# Patient Record
Sex: FEMALE | Race: AMERICAN INDIAN OR ALASKA NATIVE | NOT HISPANIC OR LATINO | URBAN - METROPOLITAN AREA
[De-identification: names, ages, dates, MRNs, and addresses within clinical notes are randomized per-mention and may not be internally consistent; named-entity substitution may affect disease eponyms.]

---

## 2018-09-19 ENCOUNTER — EMERGENCY (EMERGENCY)
Facility: HOSPITAL | Age: 63
LOS: 1 days | Discharge: HOME | End: 2018-09-19
Attending: EMERGENCY MEDICINE
Payer: MEDICAID

## 2018-09-19 VITALS
DIASTOLIC BLOOD PRESSURE: 76 MMHG | HEART RATE: 88 BPM | OXYGEN SATURATION: 98 % | SYSTOLIC BLOOD PRESSURE: 157 MMHG | WEIGHT: 145.06 LBS | TEMPERATURE: 99 F | HEIGHT: 59 IN | RESPIRATION RATE: 18 BRPM

## 2018-09-19 LAB
ALBUMIN SERPL ELPH-MCNC: 2.7 G/DL — LOW (ref 3.5–5)
ALP SERPL-CCNC: 71 U/L — SIGNIFICANT CHANGE UP (ref 40–120)
ALT FLD-CCNC: 231 U/L DA — HIGH (ref 10–60)
ANION GAP SERPL CALC-SCNC: 8 MMOL/L — SIGNIFICANT CHANGE UP (ref 5–17)
APPEARANCE UR: CLEAR — SIGNIFICANT CHANGE UP
AST SERPL-CCNC: 116 U/L — HIGH (ref 10–40)
BASOPHILS # BLD AUTO: 0 K/UL — SIGNIFICANT CHANGE UP (ref 0–0.2)
BASOPHILS NFR BLD AUTO: 0.7 % — SIGNIFICANT CHANGE UP (ref 0–2)
BILIRUB SERPL-MCNC: 0.5 MG/DL — SIGNIFICANT CHANGE UP (ref 0.2–1.2)
BILIRUB UR-MCNC: NEGATIVE — SIGNIFICANT CHANGE UP
BUN SERPL-MCNC: 8 MG/DL — SIGNIFICANT CHANGE UP (ref 7–18)
CALCIUM SERPL-MCNC: 8.3 MG/DL — LOW (ref 8.4–10.5)
CHLORIDE SERPL-SCNC: 108 MMOL/L — SIGNIFICANT CHANGE UP (ref 96–108)
CO2 SERPL-SCNC: 25 MMOL/L — SIGNIFICANT CHANGE UP (ref 22–31)
COLOR SPEC: YELLOW — SIGNIFICANT CHANGE UP
CREAT SERPL-MCNC: 0.97 MG/DL — SIGNIFICANT CHANGE UP (ref 0.5–1.3)
DIFF PNL FLD: NEGATIVE — SIGNIFICANT CHANGE UP
EOSINOPHIL # BLD AUTO: 0.3 K/UL — SIGNIFICANT CHANGE UP (ref 0–0.5)
EOSINOPHIL NFR BLD AUTO: 4.2 % — SIGNIFICANT CHANGE UP (ref 0–6)
ERYTHROCYTE [SEDIMENTATION RATE] IN BLOOD: 7 MM/HR — SIGNIFICANT CHANGE UP (ref 0–20)
GLUCOSE SERPL-MCNC: 160 MG/DL — HIGH (ref 70–99)
GLUCOSE UR QL: NEGATIVE — SIGNIFICANT CHANGE UP
HCT VFR BLD CALC: 36.9 % — SIGNIFICANT CHANGE UP (ref 34.5–45)
HGB BLD-MCNC: 11.8 G/DL — SIGNIFICANT CHANGE UP (ref 11.5–15.5)
KETONES UR-MCNC: NEGATIVE — SIGNIFICANT CHANGE UP
LACTATE SERPL-SCNC: 1.5 MMOL/L — SIGNIFICANT CHANGE UP (ref 0.7–2)
LEUKOCYTE ESTERASE UR-ACNC: NEGATIVE — SIGNIFICANT CHANGE UP
LIDOCAIN IGE QN: 154 U/L — SIGNIFICANT CHANGE UP (ref 73–393)
LYMPHOCYTES # BLD AUTO: 1.8 K/UL — SIGNIFICANT CHANGE UP (ref 1–3.3)
LYMPHOCYTES # BLD AUTO: 28 % — SIGNIFICANT CHANGE UP (ref 13–44)
MCHC RBC-ENTMCNC: 29.4 PG — SIGNIFICANT CHANGE UP (ref 27–34)
MCHC RBC-ENTMCNC: 32 GM/DL — SIGNIFICANT CHANGE UP (ref 32–36)
MCV RBC AUTO: 92 FL — SIGNIFICANT CHANGE UP (ref 80–100)
MONOCYTES # BLD AUTO: 0.5 K/UL — SIGNIFICANT CHANGE UP (ref 0–0.9)
MONOCYTES NFR BLD AUTO: 8 % — SIGNIFICANT CHANGE UP (ref 2–14)
NEUTROPHILS # BLD AUTO: 3.9 K/UL — SIGNIFICANT CHANGE UP (ref 1.8–7.4)
NEUTROPHILS NFR BLD AUTO: 59.1 % — SIGNIFICANT CHANGE UP (ref 43–77)
NITRITE UR-MCNC: NEGATIVE — SIGNIFICANT CHANGE UP
PH UR: 6 — SIGNIFICANT CHANGE UP (ref 5–8)
PLATELET # BLD AUTO: 114 K/UL — LOW (ref 150–400)
POTASSIUM SERPL-MCNC: 4 MMOL/L — SIGNIFICANT CHANGE UP (ref 3.5–5.3)
POTASSIUM SERPL-SCNC: 4 MMOL/L — SIGNIFICANT CHANGE UP (ref 3.5–5.3)
PROT SERPL-MCNC: 5.7 G/DL — LOW (ref 6–8.3)
PROT UR-MCNC: NEGATIVE — SIGNIFICANT CHANGE UP
RBC # BLD: 4.01 M/UL — SIGNIFICANT CHANGE UP (ref 3.8–5.2)
RBC # FLD: 19.6 % — HIGH (ref 10.3–14.5)
SODIUM SERPL-SCNC: 141 MMOL/L — SIGNIFICANT CHANGE UP (ref 135–145)
SP GR SPEC: 1.01 — SIGNIFICANT CHANGE UP (ref 1.01–1.02)
UROBILINOGEN FLD QL: NEGATIVE — SIGNIFICANT CHANGE UP
WBC # BLD: 6.6 K/UL — SIGNIFICANT CHANGE UP (ref 3.8–10.5)
WBC # FLD AUTO: 6.6 K/UL — SIGNIFICANT CHANGE UP (ref 3.8–10.5)

## 2018-09-19 PROCEDURE — 99285 EMERGENCY DEPT VISIT HI MDM: CPT

## 2018-09-19 RX ORDER — SODIUM CHLORIDE 9 MG/ML
1000 INJECTION INTRAMUSCULAR; INTRAVENOUS; SUBCUTANEOUS ONCE
Qty: 0 | Refills: 0 | Status: COMPLETED | OUTPATIENT
Start: 2018-09-19 | End: 2018-09-19

## 2018-09-19 RX ORDER — SODIUM CHLORIDE 9 MG/ML
3 INJECTION INTRAMUSCULAR; INTRAVENOUS; SUBCUTANEOUS ONCE
Qty: 0 | Refills: 0 | Status: COMPLETED | OUTPATIENT
Start: 2018-09-19 | End: 2018-09-19

## 2018-09-19 RX ADMIN — SODIUM CHLORIDE 3 MILLILITER(S): 9 INJECTION INTRAMUSCULAR; INTRAVENOUS; SUBCUTANEOUS at 21:03

## 2018-09-19 RX ADMIN — SODIUM CHLORIDE 1000 MILLILITER(S): 9 INJECTION INTRAMUSCULAR; INTRAVENOUS; SUBCUTANEOUS at 21:10

## 2018-09-19 NOTE — ED PROVIDER NOTE - PROGRESS NOTE DETAILS
CT pending. Will s/o to Dr Leon ct scan negative for acute pathology. pt feels well. will dc home. rx for pepcid.

## 2018-09-19 NOTE — ED PROVIDER NOTE - OBJECTIVE STATEMENT
64 y/o F pt w/ no PMHx documented presents to ED c/o diarrhea, vomiting, abdominal pain, fever, cough x 3 days per daughter. Pt has been taking Augmentin last 2 days, Z-Pac the 5 days prior. Pt reports diarrhea is watery and has about 6-7 episodes a day. NKDA

## 2018-09-19 NOTE — ED PROVIDER NOTE - MEDICAL DECISION MAKING DETAILS
64 y/o F pt w/ vomiting, diarrhea x 3 days, on antibiotics, tenderness to epigastric area, get blood work, ESR given mouth leisons, CAT scan and reevaluate

## 2018-09-20 VITALS
HEART RATE: 80 BPM | TEMPERATURE: 98 F | SYSTOLIC BLOOD PRESSURE: 158 MMHG | DIASTOLIC BLOOD PRESSURE: 47 MMHG | RESPIRATION RATE: 18 BRPM | OXYGEN SATURATION: 100 %

## 2018-09-20 PROCEDURE — 83690 ASSAY OF LIPASE: CPT

## 2018-09-20 PROCEDURE — 80053 COMPREHEN METABOLIC PANEL: CPT

## 2018-09-20 PROCEDURE — 85652 RBC SED RATE AUTOMATED: CPT

## 2018-09-20 PROCEDURE — 99284 EMERGENCY DEPT VISIT MOD MDM: CPT | Mod: 25

## 2018-09-20 PROCEDURE — 81003 URINALYSIS AUTO W/O SCOPE: CPT

## 2018-09-20 PROCEDURE — 74177 CT ABD & PELVIS W/CONTRAST: CPT

## 2018-09-20 PROCEDURE — 83605 ASSAY OF LACTIC ACID: CPT

## 2018-09-20 PROCEDURE — 74177 CT ABD & PELVIS W/CONTRAST: CPT | Mod: 26

## 2018-09-20 PROCEDURE — 87086 URINE CULTURE/COLONY COUNT: CPT

## 2018-09-20 PROCEDURE — 85027 COMPLETE CBC AUTOMATED: CPT

## 2018-09-20 RX ORDER — FAMOTIDINE 10 MG/ML
1 INJECTION INTRAVENOUS
Qty: 14 | Refills: 0
Start: 2018-09-20 | End: 2018-09-26

## 2018-09-20 NOTE — ED ADULT NURSE NOTE - NSIMPLEMENTINTERV_GEN_ALL_ED
Implemented All Universal Safety Interventions:  [place] to call system. Call bell, personal items and telephone within reach. Instruct patient to call for assistance. Room bathroom lighting operational. Non-slip footwear when patient is off stretcher. Physically safe environment: no spills, clutter or unnecessary equipment. Stretcher in lowest position, wheels locked, appropriate side rails in place.

## 2018-09-21 LAB
CULTURE RESULTS: NO GROWTH — SIGNIFICANT CHANGE UP
SPECIMEN SOURCE: SIGNIFICANT CHANGE UP

## 2020-04-01 ENCOUNTER — EMERGENCY (EMERGENCY)
Facility: HOSPITAL | Age: 65
LOS: 1 days | End: 2020-04-01
Attending: EMERGENCY MEDICINE
Payer: MEDICARE

## 2020-04-01 VITALS
TEMPERATURE: 99 F | SYSTOLIC BLOOD PRESSURE: 158 MMHG | DIASTOLIC BLOOD PRESSURE: 63 MMHG | OXYGEN SATURATION: 96 % | RESPIRATION RATE: 20 BRPM | HEIGHT: 60 IN | WEIGHT: 160.06 LBS | HEART RATE: 115 BPM

## 2020-04-01 DIAGNOSIS — Z95.5 PRESENCE OF CORONARY ANGIOPLASTY IMPLANT AND GRAFT: Chronic | ICD-10-CM

## 2020-04-01 LAB
ALBUMIN SERPL ELPH-MCNC: 3.9 G/DL — SIGNIFICANT CHANGE UP (ref 3.3–5)
ALP SERPL-CCNC: 75 U/L — SIGNIFICANT CHANGE UP (ref 40–120)
ALT FLD-CCNC: 70 U/L — HIGH (ref 10–45)
ANION GAP SERPL CALC-SCNC: 17 MMOL/L — SIGNIFICANT CHANGE UP (ref 5–17)
AST SERPL-CCNC: 104 U/L — HIGH (ref 10–40)
B-OH-BUTYR SERPL-SCNC: 0.2 MMOL/L — SIGNIFICANT CHANGE UP
BASE EXCESS BLDV CALC-SCNC: 1.2 MMOL/L — SIGNIFICANT CHANGE UP (ref -2–2)
BASOPHILS # BLD AUTO: 0.02 K/UL — SIGNIFICANT CHANGE UP (ref 0–0.2)
BASOPHILS NFR BLD AUTO: 0.5 % — SIGNIFICANT CHANGE UP (ref 0–2)
BILIRUB SERPL-MCNC: 0.2 MG/DL — SIGNIFICANT CHANGE UP (ref 0.2–1.2)
BUN SERPL-MCNC: 20 MG/DL — SIGNIFICANT CHANGE UP (ref 7–23)
CA-I SERPL-SCNC: 1.07 MMOL/L — LOW (ref 1.12–1.3)
CALCIUM SERPL-MCNC: 8.4 MG/DL — SIGNIFICANT CHANGE UP (ref 8.4–10.5)
CHLORIDE BLDV-SCNC: 97 MMOL/L — SIGNIFICANT CHANGE UP (ref 96–108)
CHLORIDE SERPL-SCNC: 92 MMOL/L — LOW (ref 96–108)
CO2 BLDV-SCNC: 28 MMOL/L — SIGNIFICANT CHANGE UP (ref 22–30)
CO2 SERPL-SCNC: 23 MMOL/L — SIGNIFICANT CHANGE UP (ref 22–31)
CREAT SERPL-MCNC: 1.13 MG/DL — SIGNIFICANT CHANGE UP (ref 0.5–1.3)
EOSINOPHIL # BLD AUTO: 0 K/UL — SIGNIFICANT CHANGE UP (ref 0–0.5)
EOSINOPHIL NFR BLD AUTO: 0 % — SIGNIFICANT CHANGE UP (ref 0–6)
GAS PNL BLDV: 132 MMOL/L — LOW (ref 135–145)
GAS PNL BLDV: SIGNIFICANT CHANGE UP
GLUCOSE BLDV-MCNC: 464 MG/DL — CRITICAL HIGH (ref 70–99)
GLUCOSE SERPL-MCNC: 484 MG/DL — CRITICAL HIGH (ref 70–99)
HCO3 BLDV-SCNC: 27 MMOL/L — SIGNIFICANT CHANGE UP (ref 21–29)
HCT VFR BLD CALC: 36 % — SIGNIFICANT CHANGE UP (ref 34.5–45)
HCT VFR BLDA CALC: 38 % — LOW (ref 39–50)
HGB BLD CALC-MCNC: 12.3 G/DL — SIGNIFICANT CHANGE UP (ref 11.5–15.5)
HGB BLD-MCNC: 11.5 G/DL — SIGNIFICANT CHANGE UP (ref 11.5–15.5)
IMM GRANULOCYTES NFR BLD AUTO: 0.2 % — SIGNIFICANT CHANGE UP (ref 0–1.5)
LACTATE BLDV-MCNC: 2.3 MMOL/L — HIGH (ref 0.7–2)
LYMPHOCYTES # BLD AUTO: 0.99 K/UL — LOW (ref 1–3.3)
LYMPHOCYTES # BLD AUTO: 23.9 % — SIGNIFICANT CHANGE UP (ref 13–44)
MCHC RBC-ENTMCNC: 28.3 PG — SIGNIFICANT CHANGE UP (ref 27–34)
MCHC RBC-ENTMCNC: 31.9 GM/DL — LOW (ref 32–36)
MCV RBC AUTO: 88.7 FL — SIGNIFICANT CHANGE UP (ref 80–100)
MONOCYTES # BLD AUTO: 0.52 K/UL — SIGNIFICANT CHANGE UP (ref 0–0.9)
MONOCYTES NFR BLD AUTO: 12.5 % — SIGNIFICANT CHANGE UP (ref 2–14)
NEUTROPHILS # BLD AUTO: 2.61 K/UL — SIGNIFICANT CHANGE UP (ref 1.8–7.4)
NEUTROPHILS NFR BLD AUTO: 62.9 % — SIGNIFICANT CHANGE UP (ref 43–77)
NRBC # BLD: 0 /100 WBCS — SIGNIFICANT CHANGE UP (ref 0–0)
PCO2 BLDV: 48 MMHG — SIGNIFICANT CHANGE UP (ref 35–50)
PH BLDV: 7.36 — SIGNIFICANT CHANGE UP (ref 7.35–7.45)
PLATELET # BLD AUTO: 131 K/UL — LOW (ref 150–400)
PO2 BLDV: 54 MMHG — HIGH (ref 25–45)
POTASSIUM BLDV-SCNC: 4.1 MMOL/L — SIGNIFICANT CHANGE UP (ref 3.5–5.3)
POTASSIUM SERPL-MCNC: 4.3 MMOL/L — SIGNIFICANT CHANGE UP (ref 3.5–5.3)
POTASSIUM SERPL-SCNC: 4.3 MMOL/L — SIGNIFICANT CHANGE UP (ref 3.5–5.3)
PROT SERPL-MCNC: 6.8 G/DL — SIGNIFICANT CHANGE UP (ref 6–8.3)
RBC # BLD: 4.06 M/UL — SIGNIFICANT CHANGE UP (ref 3.8–5.2)
RBC # FLD: 15.1 % — HIGH (ref 10.3–14.5)
SAO2 % BLDV: 84 % — SIGNIFICANT CHANGE UP (ref 67–88)
SODIUM SERPL-SCNC: 132 MMOL/L — LOW (ref 135–145)
TROPONIN T, HIGH SENSITIVITY RESULT: 20 NG/L — SIGNIFICANT CHANGE UP (ref 0–51)
TROPONIN T, HIGH SENSITIVITY RESULT: 20 NG/L — SIGNIFICANT CHANGE UP (ref 0–51)
WBC # BLD: 4.15 K/UL — SIGNIFICANT CHANGE UP (ref 3.8–10.5)
WBC # FLD AUTO: 4.15 K/UL — SIGNIFICANT CHANGE UP (ref 3.8–10.5)

## 2020-04-01 PROCEDURE — 99285 EMERGENCY DEPT VISIT HI MDM: CPT

## 2020-04-01 PROCEDURE — 93010 ELECTROCARDIOGRAM REPORT: CPT

## 2020-04-01 PROCEDURE — 71045 X-RAY EXAM CHEST 1 VIEW: CPT | Mod: 26

## 2020-04-01 RX ORDER — ACETAMINOPHEN 500 MG
975 TABLET ORAL ONCE
Refills: 0 | Status: COMPLETED | OUTPATIENT
Start: 2020-04-01 | End: 2020-04-01

## 2020-04-01 RX ORDER — SODIUM CHLORIDE 9 MG/ML
500 INJECTION INTRAMUSCULAR; INTRAVENOUS; SUBCUTANEOUS ONCE
Refills: 0 | Status: COMPLETED | OUTPATIENT
Start: 2020-04-01 | End: 2020-04-01

## 2020-04-01 RX ORDER — INSULIN LISPRO 100/ML
30 VIAL (ML) SUBCUTANEOUS ONCE
Refills: 0 | Status: COMPLETED | OUTPATIENT
Start: 2020-04-01 | End: 2020-04-01

## 2020-04-01 RX ADMIN — Medication 30 UNIT(S): at 23:42

## 2020-04-01 RX ADMIN — SODIUM CHLORIDE 500 MILLILITER(S): 9 INJECTION INTRAMUSCULAR; INTRAVENOUS; SUBCUTANEOUS at 22:21

## 2020-04-01 RX ADMIN — Medication 975 MILLIGRAM(S): at 22:21

## 2020-04-01 NOTE — ED PROVIDER NOTE - PHYSICAL EXAMINATION
VITAL SIGNS: I have reviewed nursing notes and confirm.  CONSTITUTIONAL: NAD  SKIN: no rash, no petechiae.  EYES: pink conjunctiva, anicteric  ENT: tongue and uvular midline, no exudates, dry mucous membranes  NECK: Supple; no meningismus, no JVD  CARD: RRR, no murmurs, equal radial pulses bilaterally 2+  RESP: coarse breath sounds bilaterally, no respiratory distress  ABD: Soft, non-tender, non-distended, no peritoneal signs  EXT: No edema.  NEURO: Alert, oriented.   PSYCH: Cooperative, appropriate.

## 2020-04-01 NOTE — ED ADULT NURSE NOTE - NSIMPLEMENTINTERV_GEN_ALL_ED
Implemented All Fall Risk Interventions:  [place] to call system. Call bell, personal items and telephone within reach. Instruct patient to call for assistance. Room bathroom lighting operational. Non-slip footwear when patient is off stretcher. Physically safe environment: no spills, clutter or unnecessary equipment. Stretcher in lowest position, wheels locked, appropriate side rails in place. Provide visual cue, wrist band, yellow gown, etc. Monitor gait and stability. Monitor for mental status changes and reorient to person, place, and time. Review medications for side effects contributing to fall risk. Reinforce activity limits and safety measures with patient and family.

## 2020-04-01 NOTE — ED PROVIDER NOTE - NS ED ROS FT
Review of Systems    Constitutional: (+) fever, (+) chills, (+) fatigue  HEENT: (+) sore throat, (-) hearing loss, (-) nasal congestion  Cardiovascular: (-) chest pain, (-) syncope  Respiratory: (+) cough, (+) shortness of breath  Gastrointestinal: (+) vomiting, (+) diarrhea, (-) abdominal pain  Musculoskeletal: (-) neck pain, (-) back pain, (-) joint pain  Integumentary: (-) rash, (-) edema, (-) wound  Neurological: (-) headache, (-) altered mental status    Except as documented in the HPI, all other systems are negative.

## 2020-04-01 NOTE — ED PROVIDER NOTE - ATTENDING CONTRIBUTION TO CARE
attending Shiva: 65yF h/o DM on insulin, HTN, hypothyroidism, asthma, CAD with DANAE x 2 (2012/2013) p/w fever and cough x 1 week. Subjective fever, dry cough, myalgias, WAGNER. Tolerating PO. No known COVID contacts. Normal O2 saturation, no respiratory distress. Will obtain labs, cxr, symptomatic treatment, reassess

## 2020-04-01 NOTE — ED ADULT NURSE NOTE - OBJECTIVE STATEMENT
66 yo f pmhx of dm, htn, asthma, cad, primarily Polish speaking utilized  # [id number] MD at bedside presents to the ed with c/o sob, daniel, positive sick contact at home. PT reports she has been experiencing chest pain while coughing, constant dry cough, fevers, myalgias, for the past 8 days. PT reports treating myalgias and fevers with tylenol, reports helps for a while but symptoms returns. PT is here today because she has been experiencing emesis and diarrhea for the past three days. Pt ambulated in the ed with a cane, noted to get shortness of breath post ambulating, constant cough noted she would need to take a minute to catch her breath. PT noted to saturating in the high 90's with no supplemental oxygen. PT educated with plan of care. Pt denies headache, dizziness, palpitations, cough, abdominal pain, urinary symptoms.

## 2020-04-01 NOTE — ED PROVIDER NOTE - NSFOLLOWUPINSTRUCTIONS_ED_ALL_ED_FT
-Rest and stay well hydrated  -Take over the counter acetaminophen (Tylenol) 650-1000 mg every 4-6 hours as needed for fever/pain. Do not take more than 4000 mg in a 24 hour period. Be aware many over the counter and prescription medications also contain acetaminophen (Tylenol).    For a 14 day period:  -Stay inside your home as much as possible, avoiding public places or public interaction  -Do not go to work  -If you do enter any public domain, at minimum wear a surgical mask at all times  -Even while indoors, attempt to remain isolated from other individuals such as family or friends, as much as possible  -Return to the Emergency Department for any symptoms such as worsening shortness of breath, significant worsening cough, high fevers despite over the counter fever-reducing medications, or severe weakness/malaise

## 2020-04-01 NOTE — ED PROVIDER NOTE - PATIENT PORTAL LINK FT
You can access the FollowMyHealth Patient Portal offered by [facility] by registering at the following website: [email or web address]. By joining [employer]’s FollowMyHealth portal, you will also be able to view your health information using other applications (apps) compatible with our system.

## 2020-04-01 NOTE — ED PROVIDER NOTE - OBJECTIVE STATEMENT
Kiswahili  #[id number]    65 year female with PMH DM, HTN, hypothyroidism, asthma, CAD with DANAE x 2 (2012/2013) presents with fever and cough x 1 week. Pt reports subjective fever at home and dry cough. Patient also reports myalgias, shortness of breath with exertion over the past 3-4 days, 1 episode of nonbloody nonbilious emesis today, and 3-4 episodes of watery diarrhea daily over the past 3 days. Tolerating PO intake. Patient states she has family members with similar symptoms, denies any ill contacts with known COVID+. Denies any palpitations, syncope, vision change, focal weakness, numbness, rash, or dysuria. Tolerating PO intake. Pt reports using her albuterol inhaler twice today with little improvement, last dose this morning. Pt also reports taking Tylenol with temporary improvement of fever and myalgias, last dose this morning. Denies any history of intubation due to asthma. Denies any additional complaints.

## 2020-04-01 NOTE — ED PROVIDER NOTE - PROGRESS NOTE DETAILS
Elana-PGY1: spoke to pt's daughter-in-law Anila (interpreted). Clarified patient is prescribed novolog 30 units with breakfast and lunch and 20 u with dinner as well as lantus 30 mg once daily. Patient states she took her lantus and 1 dose of novolog today. Will administer home dose of novolog and and recheck glucose. Daughter-in-law also states patient was seen at [facility] 4 days ago and discharged, was called today with positive COVID tests. Elana-PGY1: ambulatory SpO2 97%. Will prepare for DC with isolation/return precautions. Elana-PGY1: ambulatory SpO2 97%. Discussed results/disposition with patient's daughter-in-law Anila. Will prepare for DC with isolation/return precautions.

## 2020-04-01 NOTE — ED PROVIDER NOTE - CLINICAL SUMMARY MEDICAL DECISION MAKING FREE TEXT BOX
Elana-PGY1: 65 year female with PMH DM, HTN, hypothyroidism, asthma, CAD with DANAE x 2 (2012/2013) presents with fever and cough x 1 week. Likely COVID as patient has URI symptoms, fever, and ill contacts. Abdominal exam benign, nontender. Patient oxygenating well on room air with no respiratory distress upon ambulation. Plan includes labs, CXR, symptomatic treatment and reassessment with likely discharge with isolation/return precautions.

## 2020-04-02 VITALS
OXYGEN SATURATION: 95 % | RESPIRATION RATE: 20 BRPM | TEMPERATURE: 98 F | HEART RATE: 95 BPM | SYSTOLIC BLOOD PRESSURE: 147 MMHG | DIASTOLIC BLOOD PRESSURE: 71 MMHG

## 2020-04-02 PROCEDURE — 84295 ASSAY OF SERUM SODIUM: CPT

## 2020-04-02 PROCEDURE — 82803 BLOOD GASES ANY COMBINATION: CPT

## 2020-04-02 PROCEDURE — 85014 HEMATOCRIT: CPT

## 2020-04-02 PROCEDURE — 84484 ASSAY OF TROPONIN QUANT: CPT

## 2020-04-02 PROCEDURE — 93005 ELECTROCARDIOGRAM TRACING: CPT

## 2020-04-02 PROCEDURE — 84132 ASSAY OF SERUM POTASSIUM: CPT

## 2020-04-02 PROCEDURE — 82947 ASSAY GLUCOSE BLOOD QUANT: CPT

## 2020-04-02 PROCEDURE — 71045 X-RAY EXAM CHEST 1 VIEW: CPT

## 2020-04-02 PROCEDURE — 99283 EMERGENCY DEPT VISIT LOW MDM: CPT | Mod: 25

## 2020-04-02 PROCEDURE — 83605 ASSAY OF LACTIC ACID: CPT

## 2020-04-02 PROCEDURE — 82962 GLUCOSE BLOOD TEST: CPT

## 2020-04-02 PROCEDURE — 82010 KETONE BODYS QUAN: CPT

## 2020-04-02 PROCEDURE — 82435 ASSAY OF BLOOD CHLORIDE: CPT

## 2020-04-02 PROCEDURE — 80053 COMPREHEN METABOLIC PANEL: CPT

## 2020-04-02 PROCEDURE — 85027 COMPLETE CBC AUTOMATED: CPT

## 2020-04-02 PROCEDURE — 82330 ASSAY OF CALCIUM: CPT

## 2020-04-02 NOTE — ED ADULT NURSE REASSESSMENT NOTE - NS ED NURSE REASSESS COMMENT FT1
Called pt's daughter ([phone]) to given DC instruction . waiting for son' law to pick her up Called pt's daughter ([phone]) Antonietta  to given DC instruction . waiting for son' law to pick her up

## 2020-04-02 NOTE — ED ADULT NURSE REASSESSMENT NOTE - NS ED NURSE REASSESS COMMENT FT1
received report from Bobbi Sarkar at 0710, Waiting for DC, pt waiting for son nia rocha to pick her up at 0730

## 2020-04-06 ENCOUNTER — INPATIENT (INPATIENT)
Facility: HOSPITAL | Age: 65
LOS: 0 days | Discharge: ANOTHER HOSPITAL | DRG: 871 | End: 2020-04-07
Attending: INTERNAL MEDICINE | Admitting: INTERNAL MEDICINE
Payer: MEDICARE

## 2020-04-06 VITALS
DIASTOLIC BLOOD PRESSURE: 71 MMHG | HEART RATE: 96 BPM | SYSTOLIC BLOOD PRESSURE: 134 MMHG | RESPIRATION RATE: 18 BRPM | OXYGEN SATURATION: 97 % | TEMPERATURE: 98 F

## 2020-04-06 DIAGNOSIS — Z29.9 ENCOUNTER FOR PROPHYLACTIC MEASURES, UNSPECIFIED: ICD-10-CM

## 2020-04-06 DIAGNOSIS — E07.9 DISORDER OF THYROID, UNSPECIFIED: ICD-10-CM

## 2020-04-06 DIAGNOSIS — N39.0 URINARY TRACT INFECTION, SITE NOT SPECIFIED: ICD-10-CM

## 2020-04-06 DIAGNOSIS — N12 TUBULO-INTERSTITIAL NEPHRITIS, NOT SPECIFIED AS ACUTE OR CHRONIC: ICD-10-CM

## 2020-04-06 DIAGNOSIS — I25.10 ATHEROSCLEROTIC HEART DISEASE OF NATIVE CORONARY ARTERY WITHOUT ANGINA PECTORIS: ICD-10-CM

## 2020-04-06 DIAGNOSIS — E11.9 TYPE 2 DIABETES MELLITUS WITHOUT COMPLICATIONS: ICD-10-CM

## 2020-04-06 DIAGNOSIS — J18.9 PNEUMONIA, UNSPECIFIED ORGANISM: ICD-10-CM

## 2020-04-06 LAB
ALBUMIN SERPL ELPH-MCNC: 2.8 G/DL — LOW (ref 3.5–5)
ALP SERPL-CCNC: 49 U/L — SIGNIFICANT CHANGE UP (ref 40–120)
ALT FLD-CCNC: 91 U/L DA — HIGH (ref 10–60)
ANION GAP SERPL CALC-SCNC: 7 MMOL/L — SIGNIFICANT CHANGE UP (ref 5–17)
APPEARANCE UR: CLEAR — SIGNIFICANT CHANGE UP
AST SERPL-CCNC: 114 U/L — HIGH (ref 10–40)
BACTERIA # UR AUTO: ABNORMAL /HPF
BASOPHILS # BLD AUTO: 0.01 K/UL — SIGNIFICANT CHANGE UP (ref 0–0.2)
BASOPHILS NFR BLD AUTO: 0.3 % — SIGNIFICANT CHANGE UP (ref 0–2)
BILIRUB SERPL-MCNC: 0.3 MG/DL — SIGNIFICANT CHANGE UP (ref 0.2–1.2)
BILIRUB UR-MCNC: NEGATIVE — SIGNIFICANT CHANGE UP
BUN SERPL-MCNC: 7 MG/DL — SIGNIFICANT CHANGE UP (ref 7–18)
CALCIUM SERPL-MCNC: 7.9 MG/DL — LOW (ref 8.4–10.5)
CHLORIDE SERPL-SCNC: 103 MMOL/L — SIGNIFICANT CHANGE UP (ref 96–108)
CO2 SERPL-SCNC: 27 MMOL/L — SIGNIFICANT CHANGE UP (ref 22–31)
COLOR SPEC: YELLOW — SIGNIFICANT CHANGE UP
CREAT SERPL-MCNC: 1.05 MG/DL — SIGNIFICANT CHANGE UP (ref 0.5–1.3)
DIFF PNL FLD: ABNORMAL
EOSINOPHIL # BLD AUTO: 0 K/UL — SIGNIFICANT CHANGE UP (ref 0–0.5)
EOSINOPHIL NFR BLD AUTO: 0 % — SIGNIFICANT CHANGE UP (ref 0–6)
EPI CELLS # UR: SIGNIFICANT CHANGE UP /HPF
GLUCOSE SERPL-MCNC: 149 MG/DL — HIGH (ref 70–99)
GLUCOSE UR QL: 100 MG/DL
HCT VFR BLD CALC: 33.8 % — LOW (ref 34.5–45)
HGB BLD-MCNC: 10.7 G/DL — LOW (ref 11.5–15.5)
IMM GRANULOCYTES NFR BLD AUTO: 0.8 % — SIGNIFICANT CHANGE UP (ref 0–1.5)
KETONES UR-MCNC: NEGATIVE — SIGNIFICANT CHANGE UP
LACTATE SERPL-SCNC: 1.7 MMOL/L — SIGNIFICANT CHANGE UP (ref 0.7–2)
LEUKOCYTE ESTERASE UR-ACNC: NEGATIVE — SIGNIFICANT CHANGE UP
LYMPHOCYTES # BLD AUTO: 0.89 K/UL — LOW (ref 1–3.3)
LYMPHOCYTES # BLD AUTO: 22.8 % — SIGNIFICANT CHANGE UP (ref 13–44)
MCHC RBC-ENTMCNC: 28.3 PG — SIGNIFICANT CHANGE UP (ref 27–34)
MCHC RBC-ENTMCNC: 31.7 GM/DL — LOW (ref 32–36)
MCV RBC AUTO: 89.4 FL — SIGNIFICANT CHANGE UP (ref 80–100)
MONOCYTES # BLD AUTO: 0.27 K/UL — SIGNIFICANT CHANGE UP (ref 0–0.9)
MONOCYTES NFR BLD AUTO: 6.9 % — SIGNIFICANT CHANGE UP (ref 2–14)
NEUTROPHILS # BLD AUTO: 2.71 K/UL — SIGNIFICANT CHANGE UP (ref 1.8–7.4)
NEUTROPHILS NFR BLD AUTO: 69.2 % — SIGNIFICANT CHANGE UP (ref 43–77)
NITRITE UR-MCNC: POSITIVE
NRBC # BLD: 0 /100 WBCS — SIGNIFICANT CHANGE UP (ref 0–0)
PH UR: 8 — SIGNIFICANT CHANGE UP (ref 5–8)
PLATELET # BLD AUTO: 146 K/UL — LOW (ref 150–400)
POTASSIUM SERPL-MCNC: 3.6 MMOL/L — SIGNIFICANT CHANGE UP (ref 3.5–5.3)
POTASSIUM SERPL-SCNC: 3.6 MMOL/L — SIGNIFICANT CHANGE UP (ref 3.5–5.3)
PROT SERPL-MCNC: 6.4 G/DL — SIGNIFICANT CHANGE UP (ref 6–8.3)
PROT UR-MCNC: 100
RBC # BLD: 3.78 M/UL — LOW (ref 3.8–5.2)
RBC # FLD: 15.7 % — HIGH (ref 10.3–14.5)
RBC CASTS # UR COMP ASSIST: SIGNIFICANT CHANGE UP /HPF (ref 0–2)
SODIUM SERPL-SCNC: 137 MMOL/L — SIGNIFICANT CHANGE UP (ref 135–145)
SP GR SPEC: 1.01 — SIGNIFICANT CHANGE UP (ref 1.01–1.02)
TROPONIN I SERPL-MCNC: 0.02 NG/ML — SIGNIFICANT CHANGE UP (ref 0–0.04)
UROBILINOGEN FLD QL: 1
WBC # BLD: 3.91 K/UL — SIGNIFICANT CHANGE UP (ref 3.8–10.5)
WBC # FLD AUTO: 3.91 K/UL — SIGNIFICANT CHANGE UP (ref 3.8–10.5)
WBC UR QL: SIGNIFICANT CHANGE UP /HPF (ref 0–5)

## 2020-04-06 PROCEDURE — 99223 1ST HOSP IP/OBS HIGH 75: CPT

## 2020-04-06 PROCEDURE — 99285 EMERGENCY DEPT VISIT HI MDM: CPT

## 2020-04-06 PROCEDURE — 71045 X-RAY EXAM CHEST 1 VIEW: CPT | Mod: 26

## 2020-04-06 RX ORDER — CEFTRIAXONE 500 MG/1
1000 INJECTION, POWDER, FOR SOLUTION INTRAMUSCULAR; INTRAVENOUS ONCE
Refills: 0 | Status: COMPLETED | OUTPATIENT
Start: 2020-04-06 | End: 2020-04-06

## 2020-04-06 RX ORDER — ASCORBIC ACID 60 MG
500 TABLET,CHEWABLE ORAL DAILY
Refills: 0 | Status: DISCONTINUED | OUTPATIENT
Start: 2020-04-06 | End: 2020-04-07

## 2020-04-06 RX ORDER — AZITHROMYCIN 500 MG/1
500 TABLET, FILM COATED ORAL DAILY
Refills: 0 | Status: DISCONTINUED | OUTPATIENT
Start: 2020-04-06 | End: 2020-04-07

## 2020-04-06 RX ORDER — CEFTRIAXONE 500 MG/1
1000 INJECTION, POWDER, FOR SOLUTION INTRAMUSCULAR; INTRAVENOUS EVERY 24 HOURS
Refills: 0 | Status: DISCONTINUED | OUTPATIENT
Start: 2020-04-07 | End: 2020-04-07

## 2020-04-06 RX ORDER — HEPARIN SODIUM 5000 [USP'U]/ML
5000 INJECTION INTRAVENOUS; SUBCUTANEOUS EVERY 8 HOURS
Refills: 0 | Status: DISCONTINUED | OUTPATIENT
Start: 2020-04-06 | End: 2020-04-07

## 2020-04-06 RX ORDER — ACETAMINOPHEN 500 MG
650 TABLET ORAL EVERY 6 HOURS
Refills: 0 | Status: DISCONTINUED | OUTPATIENT
Start: 2020-04-06 | End: 2020-04-07

## 2020-04-06 RX ORDER — SODIUM CHLORIDE 9 MG/ML
1000 INJECTION INTRAMUSCULAR; INTRAVENOUS; SUBCUTANEOUS ONCE
Refills: 0 | Status: COMPLETED | OUTPATIENT
Start: 2020-04-06 | End: 2020-04-06

## 2020-04-06 RX ORDER — INSULIN LISPRO 100/ML
VIAL (ML) SUBCUTANEOUS
Refills: 0 | Status: DISCONTINUED | OUTPATIENT
Start: 2020-04-06 | End: 2020-04-07

## 2020-04-06 RX ORDER — THIAMINE MONONITRATE (VIT B1) 100 MG
100 TABLET ORAL DAILY
Refills: 0 | Status: DISCONTINUED | OUTPATIENT
Start: 2020-04-06 | End: 2020-04-07

## 2020-04-06 RX ADMIN — CEFTRIAXONE 100 MILLIGRAM(S): 500 INJECTION, POWDER, FOR SOLUTION INTRAMUSCULAR; INTRAVENOUS at 20:39

## 2020-04-06 RX ADMIN — SODIUM CHLORIDE 1000 MILLILITER(S): 9 INJECTION INTRAMUSCULAR; INTRAVENOUS; SUBCUTANEOUS at 20:44

## 2020-04-06 RX ADMIN — CEFTRIAXONE 1000 MILLIGRAM(S): 500 INJECTION, POWDER, FOR SOLUTION INTRAMUSCULAR; INTRAVENOUS at 20:44

## 2020-04-06 RX ADMIN — SODIUM CHLORIDE 1000 MILLILITER(S): 9 INJECTION INTRAMUSCULAR; INTRAVENOUS; SUBCUTANEOUS at 20:10

## 2020-04-06 NOTE — H&P ADULT - PROBLEM SELECTOR PLAN 1
Patient p/w urinary symptoms, UA is mildly positive with nitrites , no WBC  -Does not meet sepsis criteria  -Follow urine cultures, blood cultures   -Start IV Ceftriaxone Patient meets sepsis criteria with Temp 102F, HR >90, UTI as suspected source   Follow Blood cultures, Urine cultures   Start IV Rocephin and Zithromax  Defer renal son to r/o pyelo or obstructive uropathy given suspected Covid status

## 2020-04-06 NOTE — ED PROVIDER NOTE - CONSTITUTIONAL, MLM
normal... Well appearing, awake, alert, oriented to person, place, time/situation and in no apparent distress. obese

## 2020-04-06 NOTE — H&P ADULT - HISTORY OF PRESENT ILLNESS
65 yr old female with hx of HLD, HTN, DM on lantus /novolog, stent x2 cad, cholecystectomy, JANIE/BSO presents to ed via ems sob worsening last night, coughing a lot and pleuritic chest pressure.  Pt states that for the past 2 days, she has  worsening urinary frequency and burning with pelvic pain and frequency. She denied  fever, no sick contact. Pt had fever, coughing, vomiting and diarrhea 1 wk ago and was found Covid + at [facility].     ED Course: Patient was saturating well on room air, she was coughing constantly.

## 2020-04-06 NOTE — ED PROVIDER NOTE - OBJECTIVE STATEMENT
65 yr old female with hx of HLD, HTN, DM on lantus/novolog, thyroid, stent x2 cad, cholecystectomy, JANIE/BSO presents to ed via ems sob worsening last night, coughing a lot and pleuritic chest pressure.  pt states past 2 days worsening urinary frequency and burning with pelvic pain and frequency. diarrhea 1 day. no fever, no sick contact.   pt had fever, coughing, vomiting and diarrhea 1 wk ago. +covid [place]. 1 wk ago. daughter [phone] states pt confused since last night and forgetful.  took 2 doses of abx this am and took insulin.  has been having decrease po.  otherwise story consistent with what pt state.

## 2020-04-06 NOTE — H&P ADULT - NSHPPHYSICALEXAM_GEN_ALL_CORE
CONSTITUTIONAL: Elderly ill appearing   ENMT: Airway patent, Nasal mucosa clear. Mouth with normal mucosa. Throat has no vesicles, no oropharyngeal exudates and uvula is midline.  EYES: Clear bilaterally, pupils equal, round and reactive to light. EOMI.  CARDIAC: Normal rate, regular rhythm.  Heart sounds S1, S2.  No murmurs, rubs or gallops   RESPIRATORY: B/l coarse breath sounds   MUSCULOSKELETAL: Spine appears normal, range of motion is not limited, no muscle or joint tenderness  EXTREMITIES: No edema, cyanosis or deformity   NEUROLOGICAL: Alert and oriented, no focal deficits, no motor or sensory deficits.  SKIN: No rash, skin turgor

## 2020-04-06 NOTE — H&P ADULT - PROBLEM SELECTOR PLAN 2
-Patient reports being Covid Positive at [facility], p/w worsening cough and shortness of breath   -Chest X-ray does not show active pulmonary disease, possible right sided infiltrate   -Follow Covid 19 PCR  -IV thiamine, vitamin C, Zithromax , albuterol   -Robitussin, acetaminophen   -Monitor respiratory status Patient p/w urinary symptoms, UA is mildly positive with nitrites , no WBC  -Follow urine cultures, blood cultures   -Start IV Ceftriaxone

## 2020-04-06 NOTE — H&P ADULT - PROBLEM SELECTOR PLAN 6
IMPROVE VTE score:  [ ] Previous VTE                                                3  [ ] Thrombophilia                                             2  [ ] Lower limb paralysis                                  2        (unable to hold up >15 seconds)    [ ] Current Cancer (within 6 months)            2   []x Immobilization > 24 hrs                              1  [ ] ICU/CCU stay > 24 hours                            1  [x] Age > 60                                                         1  Improve score 2   Heparin SC -Patient has hx of CAD, c/w aspirin, statin

## 2020-04-06 NOTE — ED ADULT TRIAGE NOTE - CHIEF COMPLAINT QUOTE
biba  found confused by family , FS= 59 per ems . started w/ D10W 250ml by ems pt now more awake , alert .

## 2020-04-06 NOTE — H&P ADULT - PROBLEM SELECTOR PLAN 7
IMPROVE VTE score:  [ ] Previous VTE                                                3  [ ] Thrombophilia                                             2  [ ] Lower limb paralysis                                  2        (unable to hold up >15 seconds)    [ ] Current Cancer (within 6 months)            2   []x Immobilization > 24 hrs                              1  [ ] ICU/CCU stay > 24 hours                            1  [x] Age > 60                                                         1  Improve score 2   Heparin SC

## 2020-04-06 NOTE — ED PROVIDER NOTE - CLINICAL SUMMARY MEDICAL DECISION MAKING FREE TEXT BOX
65 yr old female with hx of HLD, HTN, DM on lantus/novolog, thyroid, stent x2 cad, cholecystectomy, JANIE/BSO presents to ed via ems sob worsening last night, coughing a lot and pleuritic chest pressure.  pt states past 2 days worsening urinary frequency and burning with pelvic pain and frequency. diarrhea 1 day. no fever, no sick contact.   pt had fever, coughing, vomiting and diarrhea 1 wk ago. +covid [place]. 1 wk ago. daughter [phone] states pt confused since last night and forgetful.  took 2 doses of abx this am and took insulin.  has been having decrease po.  otherwise story consistent with what pt state. pt in the field fs 59 d10 given. and is 304    hypoglycemia likely from uti vs covid vs pna- labs, fluids, cxr, ua, rocephin, re-assess.  repeat fs if normal can dc with close follow up.  daughter agree.

## 2020-04-06 NOTE — H&P ADULT - PROBLEM SELECTOR PLAN 4
Patient's charts indicate hx of thyroid disorder, unsure about medication list  Follow TSH , primary team to obtain medication list Patient reports hx of DM, takes insulin unsure about medication dose   Start HSS  Follow HbA1C

## 2020-04-06 NOTE — H&P ADULT - ATTENDING COMMENTS
Pt seen and examined  Case discussed with housestaff  Agree with HPI / assessment/plan  65 year old COVID + pt with worsening respiratory and new onset urinary symptoms.  Pt was on antibiotics recently.    Vital Signs Last 24 Hrs  T(C): 37.2 (2020 05:45), Max: 39.3 (2020 03:33)  T(F): 98.9 (2020 05:45), Max: 102.7 (2020 03:33)  HR: 99 (2020 05:45) (96 - 103)  BP: 139/64 (2020 05:45) (134/71 - 143/72)  RR: 20 (2020 05:45) (18 - 20)  SpO2: 98% (2020 05:45) (92% - 98%)    Middle aged; apears older than stated age,   CTA B/L  RRR S1S2 only  Soft, NT ND BS +  No pedal edema  NO focal deficits    Labs                        10.7   3.91  )-----------( 146      ( 2020 20:06 )             33.8     04-06    137  |  103  |  7   ----------------------------<  149<H>  3.6   |  27  |  1.05    Ca    7.9<L>      2020 20:06    TPro  6.4  /  Alb  2.8<L>  /  TBili  0.3  /  DBili  x   /  AST  114<H>  /  ALT  91<H>  /  AlkPhos  49  04-06      Urinalysis Basic - ( 2020 20:06 )  Color: Yellow / Appearance: Clear / S.010 / pH: x  Gluc: x / Ketone: Negative  / Bili: Negative / Urobili: 1   Blood: x / Protein: 100 / Nitrite: Positive   Leuk Esterase: Negative / RBC: 0-2 /HPF / WBC 0-2 /HPF   Sq Epi: x / Non Sq Epi: Few /HPF / Bacteria: Trace /HPF    CXR  B/L interstitial infiltrates    Impression  - Bilateral pneumonia with presumed COVID 19 infection  - UTI - urinalysis sterile mostly from the use of antibiotics recently.    Plan  Admit to Medicine with COVID 19 isolation protocol  F/up pending covid pcr and baseline acute phase reactants and serially  Supplemental O2 with close monitoring  Empiric antibiotics for UTI and pneumonia  Plaquenil regimen  Supportive care.

## 2020-04-06 NOTE — H&P ADULT - PROBLEM SELECTOR PLAN 3
Patient reports hx of DM, takes insulin unsure about medication dose   Start HSS  Follow HbA1C -Patient reports being Covid Positive at [facility], p/w worsening cough and shortness of breath   -Chest X-ray does not show active pulmonary disease, possible right sided infiltrate   -Follow Covid 19 PCR  -IV thiamine, vitamin C, Zithromax , albuterol   -Robitussin, acetaminophen   -Monitor respiratory status

## 2020-04-06 NOTE — H&P ADULT - PROBLEM SELECTOR PLAN 5
-Patient has hx of CAD, c/w aspirin, statin Patient's charts indicate hx of thyroid disorder, unsure about medication list  Follow TSH , primary team to obtain medication list

## 2020-04-06 NOTE — H&P ADULT - ASSESSMENT
65 yr old female with hx of HLD, HTN, DM on lantus /novolog, stent x2 cad, cholecystectomy, JANIE/BSO presents to ed via ems sob worsening last night, coughing a lot and pleuritic chest pressure, along with urinary symptoms.    Patient is admitted for UTI and PNA.

## 2020-04-07 ENCOUNTER — INPATIENT (INPATIENT)
Facility: HOSPITAL | Age: 65
LOS: 3 days | Discharge: OTHER INSTITUTION | DRG: 177 | End: 2020-04-11
Attending: INTERNAL MEDICINE | Admitting: INTERNAL MEDICINE
Payer: MEDICARE

## 2020-04-07 VITALS
DIASTOLIC BLOOD PRESSURE: 75 MMHG | HEART RATE: 103 BPM | RESPIRATION RATE: 18 BRPM | TEMPERATURE: 98 F | SYSTOLIC BLOOD PRESSURE: 135 MMHG | OXYGEN SATURATION: 97 %

## 2020-04-07 VITALS
OXYGEN SATURATION: 100 % | RESPIRATION RATE: 19 BRPM | TEMPERATURE: 98 F | DIASTOLIC BLOOD PRESSURE: 55 MMHG | SYSTOLIC BLOOD PRESSURE: 104 MMHG | HEART RATE: 102 BPM

## 2020-04-07 DIAGNOSIS — Z98.890 OTHER SPECIFIED POSTPROCEDURAL STATES: Chronic | ICD-10-CM

## 2020-04-07 DIAGNOSIS — J45.909 UNSPECIFIED ASTHMA, UNCOMPLICATED: ICD-10-CM

## 2020-04-07 DIAGNOSIS — U07.1 COVID-19: ICD-10-CM

## 2020-04-07 DIAGNOSIS — R74.0 NONSPECIFIC ELEVATION OF LEVELS OF TRANSAMINASE AND LACTIC ACID DEHYDROGENASE [LDH]: ICD-10-CM

## 2020-04-07 DIAGNOSIS — E11.9 TYPE 2 DIABETES MELLITUS WITHOUT COMPLICATIONS: ICD-10-CM

## 2020-04-07 DIAGNOSIS — I10 ESSENTIAL (PRIMARY) HYPERTENSION: ICD-10-CM

## 2020-04-07 DIAGNOSIS — A41.9 SEPSIS, UNSPECIFIED ORGANISM: ICD-10-CM

## 2020-04-07 DIAGNOSIS — R19.7 DIARRHEA, UNSPECIFIED: ICD-10-CM

## 2020-04-07 DIAGNOSIS — E78.5 HYPERLIPIDEMIA, UNSPECIFIED: ICD-10-CM

## 2020-04-07 DIAGNOSIS — R63.8 OTHER SYMPTOMS AND SIGNS CONCERNING FOOD AND FLUID INTAKE: ICD-10-CM

## 2020-04-07 DIAGNOSIS — R82.90 UNSPECIFIED ABNORMAL FINDINGS IN URINE: ICD-10-CM

## 2020-04-07 LAB
ALBUMIN SERPL ELPH-MCNC: 2.7 G/DL — LOW (ref 3.5–5)
ALP SERPL-CCNC: 46 U/L — SIGNIFICANT CHANGE UP (ref 40–120)
ALT FLD-CCNC: 87 U/L DA — HIGH (ref 10–60)
ANION GAP SERPL CALC-SCNC: 5 MMOL/L — SIGNIFICANT CHANGE UP (ref 5–17)
AST SERPL-CCNC: 106 U/L — HIGH (ref 10–40)
BASOPHILS # BLD AUTO: 0.01 K/UL — SIGNIFICANT CHANGE UP (ref 0–0.2)
BASOPHILS NFR BLD AUTO: 0.3 % — SIGNIFICANT CHANGE UP (ref 0–2)
BILIRUB SERPL-MCNC: 0.3 MG/DL — SIGNIFICANT CHANGE UP (ref 0.2–1.2)
BUN SERPL-MCNC: 9 MG/DL — SIGNIFICANT CHANGE UP (ref 7–18)
CALCIUM SERPL-MCNC: 8.1 MG/DL — LOW (ref 8.4–10.5)
CHLORIDE SERPL-SCNC: 103 MMOL/L — SIGNIFICANT CHANGE UP (ref 96–108)
CHOLEST SERPL-MCNC: 101 MG/DL — SIGNIFICANT CHANGE UP (ref 10–199)
CO2 SERPL-SCNC: 30 MMOL/L — SIGNIFICANT CHANGE UP (ref 22–31)
CREAT SERPL-MCNC: 1.02 MG/DL — SIGNIFICANT CHANGE UP (ref 0.5–1.3)
CRP SERPL-MCNC: 5.41 MG/DL — HIGH (ref 0–0.4)
D DIMER BLD IA.RAPID-MCNC: 257 NG/ML DDU — HIGH
EOSINOPHIL # BLD AUTO: 0 K/UL — SIGNIFICANT CHANGE UP (ref 0–0.5)
EOSINOPHIL NFR BLD AUTO: 0 % — SIGNIFICANT CHANGE UP (ref 0–6)
ERYTHROCYTE [SEDIMENTATION RATE] IN BLOOD: 36 MM/HR — HIGH (ref 0–20)
FERRITIN SERPL-MCNC: 485 NG/ML — HIGH (ref 15–150)
GLUCOSE BLDC GLUCOMTR-MCNC: 184 MG/DL — HIGH (ref 70–99)
GLUCOSE SERPL-MCNC: 278 MG/DL — HIGH (ref 70–99)
HBA1C BLD-MCNC: 7.6 % — HIGH (ref 4–5.6)
HCT VFR BLD CALC: 35.9 % — SIGNIFICANT CHANGE UP (ref 34.5–45)
HCV AB S/CO SERPL IA: 0.11 S/CO — SIGNIFICANT CHANGE UP (ref 0–0.99)
HCV AB SERPL-IMP: SIGNIFICANT CHANGE UP
HDLC SERPL-MCNC: 68 MG/DL — SIGNIFICANT CHANGE UP
HGB BLD-MCNC: 11.1 G/DL — LOW (ref 11.5–15.5)
IMM GRANULOCYTES NFR BLD AUTO: 1.6 % — HIGH (ref 0–1.5)
INR BLD: 1.02 RATIO — SIGNIFICANT CHANGE UP (ref 0.88–1.16)
LDH SERPL L TO P-CCNC: 659 U/L — HIGH (ref 120–225)
LIPID PNL WITH DIRECT LDL SERPL: 14 MG/DL — SIGNIFICANT CHANGE UP
LYMPHOCYTES # BLD AUTO: 0.83 K/UL — LOW (ref 1–3.3)
LYMPHOCYTES # BLD AUTO: 26.3 % — SIGNIFICANT CHANGE UP (ref 13–44)
MAGNESIUM SERPL-MCNC: 2.3 MG/DL — SIGNIFICANT CHANGE UP (ref 1.6–2.6)
MCHC RBC-ENTMCNC: 28 PG — SIGNIFICANT CHANGE UP (ref 27–34)
MCHC RBC-ENTMCNC: 30.9 GM/DL — LOW (ref 32–36)
MCV RBC AUTO: 90.7 FL — SIGNIFICANT CHANGE UP (ref 80–100)
MONOCYTES # BLD AUTO: 0.23 K/UL — SIGNIFICANT CHANGE UP (ref 0–0.9)
MONOCYTES NFR BLD AUTO: 7.3 % — SIGNIFICANT CHANGE UP (ref 2–14)
NEUTROPHILS # BLD AUTO: 2.04 K/UL — SIGNIFICANT CHANGE UP (ref 1.8–7.4)
NEUTROPHILS NFR BLD AUTO: 64.5 % — SIGNIFICANT CHANGE UP (ref 43–77)
NRBC # BLD: 0 /100 WBCS — SIGNIFICANT CHANGE UP (ref 0–0)
PHOSPHATE SERPL-MCNC: 2.8 MG/DL — SIGNIFICANT CHANGE UP (ref 2.5–4.5)
PLATELET # BLD AUTO: 169 K/UL — SIGNIFICANT CHANGE UP (ref 150–400)
POTASSIUM SERPL-MCNC: 4.3 MMOL/L — SIGNIFICANT CHANGE UP (ref 3.5–5.3)
POTASSIUM SERPL-SCNC: 4.3 MMOL/L — SIGNIFICANT CHANGE UP (ref 3.5–5.3)
PROCALCITONIN SERPL-MCNC: 0.9 NG/ML — HIGH (ref 0.02–0.1)
PROT SERPL-MCNC: 6.3 G/DL — SIGNIFICANT CHANGE UP (ref 6–8.3)
PROTHROM AB SERPL-ACNC: 11.5 SEC — SIGNIFICANT CHANGE UP (ref 10–12.9)
RBC # BLD: 3.96 M/UL — SIGNIFICANT CHANGE UP (ref 3.8–5.2)
RBC # FLD: 15.6 % — HIGH (ref 10.3–14.5)
SARS-COV-2 RNA SPEC QL NAA+PROBE: DETECTED
SODIUM SERPL-SCNC: 138 MMOL/L — SIGNIFICANT CHANGE UP (ref 135–145)
TOTAL CHOLESTEROL/HDL RATIO MEASUREMENT: 1.5 RATIO — LOW (ref 3.3–7.1)
TRIGL SERPL-MCNC: 97 MG/DL — SIGNIFICANT CHANGE UP (ref 10–149)
TSH SERPL-MCNC: 0.9 UU/ML — SIGNIFICANT CHANGE UP (ref 0.34–4.82)
WBC # BLD: 3.16 K/UL — LOW (ref 3.8–10.5)
WBC # FLD AUTO: 3.16 K/UL — LOW (ref 3.8–10.5)

## 2020-04-07 PROCEDURE — 36415 COLL VENOUS BLD VENIPUNCTURE: CPT

## 2020-04-07 PROCEDURE — 99285 EMERGENCY DEPT VISIT HI MDM: CPT | Mod: 25

## 2020-04-07 PROCEDURE — 86803 HEPATITIS C AB TEST: CPT

## 2020-04-07 PROCEDURE — 87040 BLOOD CULTURE FOR BACTERIA: CPT

## 2020-04-07 PROCEDURE — 85652 RBC SED RATE AUTOMATED: CPT

## 2020-04-07 PROCEDURE — 82962 GLUCOSE BLOOD TEST: CPT

## 2020-04-07 PROCEDURE — 80061 LIPID PANEL: CPT

## 2020-04-07 PROCEDURE — 83615 LACTATE (LD) (LDH) ENZYME: CPT

## 2020-04-07 PROCEDURE — 84484 ASSAY OF TROPONIN QUANT: CPT

## 2020-04-07 PROCEDURE — 82728 ASSAY OF FERRITIN: CPT

## 2020-04-07 PROCEDURE — 84100 ASSAY OF PHOSPHORUS: CPT

## 2020-04-07 PROCEDURE — 85379 FIBRIN DEGRADATION QUANT: CPT

## 2020-04-07 PROCEDURE — 87086 URINE CULTURE/COLONY COUNT: CPT

## 2020-04-07 PROCEDURE — 84443 ASSAY THYROID STIM HORMONE: CPT

## 2020-04-07 PROCEDURE — 71045 X-RAY EXAM CHEST 1 VIEW: CPT

## 2020-04-07 PROCEDURE — 99285 EMERGENCY DEPT VISIT HI MDM: CPT

## 2020-04-07 PROCEDURE — 83036 HEMOGLOBIN GLYCOSYLATED A1C: CPT

## 2020-04-07 PROCEDURE — 85610 PROTHROMBIN TIME: CPT

## 2020-04-07 PROCEDURE — 86140 C-REACTIVE PROTEIN: CPT

## 2020-04-07 PROCEDURE — 81001 URINALYSIS AUTO W/SCOPE: CPT

## 2020-04-07 PROCEDURE — 84145 PROCALCITONIN (PCT): CPT

## 2020-04-07 PROCEDURE — 96374 THER/PROPH/DIAG INJ IV PUSH: CPT

## 2020-04-07 PROCEDURE — 83605 ASSAY OF LACTIC ACID: CPT

## 2020-04-07 PROCEDURE — 85027 COMPLETE CBC AUTOMATED: CPT

## 2020-04-07 PROCEDURE — 80053 COMPREHEN METABOLIC PANEL: CPT

## 2020-04-07 PROCEDURE — 83735 ASSAY OF MAGNESIUM: CPT

## 2020-04-07 PROCEDURE — 71045 X-RAY EXAM CHEST 1 VIEW: CPT | Mod: 26

## 2020-04-07 PROCEDURE — 99223 1ST HOSP IP/OBS HIGH 75: CPT | Mod: GC

## 2020-04-07 RX ORDER — DEXTROSE 50 % IN WATER 50 %
15 SYRINGE (ML) INTRAVENOUS ONCE
Refills: 0 | Status: DISCONTINUED | OUTPATIENT
Start: 2020-04-07 | End: 2020-04-11

## 2020-04-07 RX ORDER — FAMOTIDINE 10 MG/ML
20 INJECTION INTRAVENOUS
Refills: 0 | Status: DISCONTINUED | OUTPATIENT
Start: 2020-04-07 | End: 2020-04-11

## 2020-04-07 RX ORDER — AZITHROMYCIN 500 MG/1
250 TABLET, FILM COATED ORAL
Refills: 0 | Status: DISCONTINUED | OUTPATIENT
Start: 2020-04-08 | End: 2020-04-09

## 2020-04-07 RX ORDER — GLUCAGON INJECTION, SOLUTION 0.5 MG/.1ML
1 INJECTION, SOLUTION SUBCUTANEOUS ONCE
Refills: 0 | Status: DISCONTINUED | OUTPATIENT
Start: 2020-04-07 | End: 2020-04-11

## 2020-04-07 RX ORDER — HYDROXYCHLOROQUINE SULFATE 200 MG
200 TABLET ORAL EVERY 12 HOURS
Refills: 0 | Status: DISCONTINUED | OUTPATIENT
Start: 2020-04-08 | End: 2020-04-09

## 2020-04-07 RX ORDER — ASPIRIN/CALCIUM CARB/MAGNESIUM 324 MG
81 TABLET ORAL DAILY
Refills: 0 | Status: DISCONTINUED | OUTPATIENT
Start: 2020-04-07 | End: 2020-04-11

## 2020-04-07 RX ORDER — AMLODIPINE BESYLATE 2.5 MG/1
10 TABLET ORAL DAILY
Refills: 0 | Status: DISCONTINUED | OUTPATIENT
Start: 2020-04-07 | End: 2020-04-11

## 2020-04-07 RX ORDER — HYDROXYCHLOROQUINE SULFATE 200 MG
200 TABLET ORAL EVERY 12 HOURS
Refills: 0 | Status: DISCONTINUED | OUTPATIENT
Start: 2020-04-08 | End: 2020-04-07

## 2020-04-07 RX ORDER — ENOXAPARIN SODIUM 100 MG/ML
40 INJECTION SUBCUTANEOUS EVERY 24 HOURS
Refills: 0 | Status: DISCONTINUED | OUTPATIENT
Start: 2020-04-07 | End: 2020-04-11

## 2020-04-07 RX ORDER — HYDROXYCHLOROQUINE SULFATE 200 MG
TABLET ORAL
Refills: 0 | Status: DISCONTINUED | OUTPATIENT
Start: 2020-04-07 | End: 2020-04-07

## 2020-04-07 RX ORDER — FOLIC ACID 0.8 MG
1 TABLET ORAL DAILY
Refills: 0 | Status: DISCONTINUED | OUTPATIENT
Start: 2020-04-07 | End: 2020-04-11

## 2020-04-07 RX ORDER — ISOSORBIDE MONONITRATE 60 MG/1
120 TABLET, EXTENDED RELEASE ORAL DAILY
Refills: 0 | Status: DISCONTINUED | OUTPATIENT
Start: 2020-04-07 | End: 2020-04-11

## 2020-04-07 RX ORDER — SULFASALAZINE 500 MG
500 TABLET ORAL
Refills: 0 | Status: DISCONTINUED | OUTPATIENT
Start: 2020-04-07 | End: 2020-04-11

## 2020-04-07 RX ORDER — BUDESONIDE AND FORMOTEROL FUMARATE DIHYDRATE 160; 4.5 UG/1; UG/1
2 AEROSOL RESPIRATORY (INHALATION)
Refills: 0 | Status: DISCONTINUED | OUTPATIENT
Start: 2020-04-07 | End: 2020-04-11

## 2020-04-07 RX ORDER — LEVOTHYROXINE SODIUM 125 MCG
75 TABLET ORAL DAILY
Refills: 0 | Status: DISCONTINUED | OUTPATIENT
Start: 2020-04-07 | End: 2020-04-11

## 2020-04-07 RX ORDER — DEXTROSE 50 % IN WATER 50 %
12.5 SYRINGE (ML) INTRAVENOUS ONCE
Refills: 0 | Status: DISCONTINUED | OUTPATIENT
Start: 2020-04-07 | End: 2020-04-11

## 2020-04-07 RX ORDER — DONEPEZIL HYDROCHLORIDE 10 MG/1
5 TABLET, FILM COATED ORAL AT BEDTIME
Refills: 0 | Status: DISCONTINUED | OUTPATIENT
Start: 2020-04-07 | End: 2020-04-11

## 2020-04-07 RX ORDER — HYDRALAZINE HCL 50 MG
100 TABLET ORAL
Refills: 0 | Status: DISCONTINUED | OUTPATIENT
Start: 2020-04-07 | End: 2020-04-11

## 2020-04-07 RX ORDER — HYDROXYCHLOROQUINE SULFATE 200 MG
400 TABLET ORAL EVERY 12 HOURS
Refills: 0 | Status: DISCONTINUED | OUTPATIENT
Start: 2020-04-07 | End: 2020-04-07

## 2020-04-07 RX ORDER — LORATADINE 10 MG/1
10 TABLET ORAL DAILY
Refills: 0 | Status: DISCONTINUED | OUTPATIENT
Start: 2020-04-07 | End: 2020-04-11

## 2020-04-07 RX ORDER — ALBUTEROL 90 UG/1
2 AEROSOL, METERED ORAL EVERY 6 HOURS
Refills: 0 | Status: DISCONTINUED | OUTPATIENT
Start: 2020-04-07 | End: 2020-04-11

## 2020-04-07 RX ORDER — ALBUTEROL 90 UG/1
3 AEROSOL, METERED ORAL
Qty: 0 | Refills: 0 | DISCHARGE

## 2020-04-07 RX ORDER — FUROSEMIDE 40 MG
20 TABLET ORAL DAILY
Refills: 0 | Status: DISCONTINUED | OUTPATIENT
Start: 2020-04-07 | End: 2020-04-09

## 2020-04-07 RX ORDER — SODIUM CHLORIDE 9 MG/ML
1000 INJECTION, SOLUTION INTRAVENOUS
Refills: 0 | Status: DISCONTINUED | OUTPATIENT
Start: 2020-04-07 | End: 2020-04-11

## 2020-04-07 RX ORDER — DEXTROSE 50 % IN WATER 50 %
25 SYRINGE (ML) INTRAVENOUS ONCE
Refills: 0 | Status: DISCONTINUED | OUTPATIENT
Start: 2020-04-07 | End: 2020-04-11

## 2020-04-07 RX ORDER — ACETAMINOPHEN 500 MG
500 TABLET ORAL EVERY 6 HOURS
Refills: 0 | Status: DISCONTINUED | OUTPATIENT
Start: 2020-04-07 | End: 2020-04-10

## 2020-04-07 RX ORDER — INSULIN GLARGINE 100 [IU]/ML
3 INJECTION, SOLUTION SUBCUTANEOUS AT BEDTIME
Refills: 0 | Status: DISCONTINUED | OUTPATIENT
Start: 2020-04-07 | End: 2020-04-10

## 2020-04-07 RX ORDER — ATORVASTATIN CALCIUM 80 MG/1
80 TABLET, FILM COATED ORAL AT BEDTIME
Refills: 0 | Status: DISCONTINUED | OUTPATIENT
Start: 2020-04-07 | End: 2020-04-11

## 2020-04-07 RX ORDER — INSULIN LISPRO 100/ML
VIAL (ML) SUBCUTANEOUS
Refills: 0 | Status: DISCONTINUED | OUTPATIENT
Start: 2020-04-07 | End: 2020-04-11

## 2020-04-07 RX ORDER — GABAPENTIN 400 MG/1
600 CAPSULE ORAL THREE TIMES A DAY
Refills: 0 | Status: DISCONTINUED | OUTPATIENT
Start: 2020-04-07 | End: 2020-04-11

## 2020-04-07 RX ADMIN — Medication 400 MILLIGRAM(S): at 15:26

## 2020-04-07 RX ADMIN — Medication 100 MILLIGRAM(S): at 13:55

## 2020-04-07 RX ADMIN — AZITHROMYCIN 500 MILLIGRAM(S): 500 TABLET, FILM COATED ORAL at 13:40

## 2020-04-07 RX ADMIN — HEPARIN SODIUM 5000 UNIT(S): 5000 INJECTION INTRAVENOUS; SUBCUTANEOUS at 07:18

## 2020-04-07 RX ADMIN — Medication 500 MILLIGRAM(S): at 13:40

## 2020-04-07 RX ADMIN — Medication 1: at 09:31

## 2020-04-07 RX ADMIN — HEPARIN SODIUM 5000 UNIT(S): 5000 INJECTION INTRAVENOUS; SUBCUTANEOUS at 13:55

## 2020-04-07 RX ADMIN — Medication 650 MILLIGRAM(S): at 03:34

## 2020-04-07 RX ADMIN — Medication 4: at 13:55

## 2020-04-07 RX ADMIN — CEFTRIAXONE 100 MILLIGRAM(S): 500 INJECTION, POWDER, FOR SOLUTION INTRAMUSCULAR; INTRAVENOUS at 03:14

## 2020-04-07 NOTE — ACUTE INTERFACILITY TRANSFER NOTE - HOSPITAL COURSE
65 yr old female with hx of HLD, HTN, DM on lantus /novolog, stent x2 cad, cholecystectomy, JANIE/BSO presents to ed via ems sob worsening last night, coughing a lot and pleuritic chest pressure.  Pt states that for the past 2 days, she has  worsening urinary frequency and burning with pelvic pain and frequency. She denied  fever, no sick contact. Pt had fever, coughing, vomiting and diarrhea 1 wk ago and was found Covid + at [facility].  admitted for PNA and UTI.   Problem/Plan - 1:  ·  Problem: Sepsis.  Plan: -p/w Temp 102F, HR >90 likely due to UTI   -Follow Blood cultures, Urine cultures   -cont Rocephin and Zithromax.      Problem/Plan - 2:  ·  Problem: UTI (urinary tract infection).  Plan: -p/w urinary symptoms, UA is mildly positive with nitrites , no WBC  -Follow urine cultures, blood cultures   -cont Ceftriaxone.      Problem/Plan - 3:  ·  Problem: Pneumonia.  Plan: -Patient reports being Covid Positive at [facility], p/w worsening cough and shortness of breath   -Chest X-ray does not show active pulmonary disease, possible right sided infiltrate   -Follow Covid 19 PCR  -supportive care.

## 2020-04-07 NOTE — H&P ADULT - PROBLEM SELECTOR PLAN 10
F: None  E: Replete Mag <2, K <4  N: DASH diet   A: Lovenox   D: Pending O2 monitoring, possible d/c on 4/8/20

## 2020-04-07 NOTE — H&P ADULT - PROBLEM SELECTOR PLAN 4
Pt reports 3 days of nonbloody diarrhea. Mild generalized abdominal tenderness on exam.   Pt takes Sulfasalazine at home however does not endorse h/o IBD, continue home dose.  - F/u stool leukocytes, if positive consider GI consult

## 2020-04-07 NOTE — H&P ADULT - PROBLEM SELECTOR PLAN 8
Pt w/ h/o asthma on PRN Albuterol and Symbicort. No wheezes on exam  - Continue home Symbicort and PRN Albuterol inhalers

## 2020-04-07 NOTE — H&P ADULT - NSICDXPASTSURGICALHX_GEN_ALL_CORE_FT
PAST SURGICAL HISTORY:  No significant past surgical history PAST SURGICAL HISTORY:  H/O cardiac catheterization

## 2020-04-07 NOTE — H&P ADULT - NSICDXPASTMEDICALHX_GEN_ALL_CORE_FT
PAST MEDICAL HISTORY:  CAD (coronary artery disease)     Diabetes PAST MEDICAL HISTORY:  Asthma     CAD (coronary artery disease)     Diabetes PAST MEDICAL HISTORY:  Asthma     CAD (coronary artery disease)     Diabetes     Hyperlipidemia     Hypertension

## 2020-04-07 NOTE — ED ADULT NURSE NOTE - CAS TRG GENERAL NORM CIRC DET
Capillary refill less/equal to 2 seconds/Strong peripheral pulses Skin Substitute Text: The defect edges were debeveled with a #15 scalpel blade.  Given the location of the defect, shape of the defect and the proximity to free margins a skin substitute graft was deemed most appropriate.  The graft material was trimmed to fit the size of the defect. The graft was then placed in the primary defect and oriented appropriately.

## 2020-04-07 NOTE — PROGRESS NOTE ADULT - PROBLEM SELECTOR PLAN 1
-p/w Temp 102F, HR >90 likely due to UTI   -Follow Blood cultures, Urine cultures   -cont Rocephin and Zithromax

## 2020-04-07 NOTE — H&P ADULT - PROBLEM SELECTOR PLAN 2
Pt with respiratory symptoms 2/2 possible COVID infection.  Procalcitonin elevated at 0.9  Pt started on Ceftriaxone for empiric coverage. Pt with respiratory symptoms 2/2 possible COVID infection.  Procalcitonin elevated at 0.9  Pt given Ceftriaxone at [place] for empiric coverage. Pt with respiratory symptoms 2/2 possible COVID infection.  Procalcitonin elevated at 0.9 given procalcitonin is elevated consider coverage for CAP as well  Pt given Ceftriaxone at [place] for empiric coverage. Will continue therapy for CAP with cef and azithro

## 2020-04-07 NOTE — H&P ADULT - PROBLEM SELECTOR PLAN 3
Pt asymptomatic.   UA positive for nitrites, small blood and small bacteria.  -, Pt started on Ceftriaxone to cover for possible CAP which also will cover for cystitis.   - Continue to monitor Pt asymptomatic.   UA positive for nitrites, small blood and small bacteria.  - Pt given Ceftriaxone which will cover for possible CAP and for cystitis.   - Continue to monitor

## 2020-04-07 NOTE — PROGRESS NOTE ADULT - ATTENDING COMMENTS
seen and examined  sent for r/o pyelo  but had fever and cough also.  pt was in [facility] for COVID  last week  addmitterd for covid as she had cough also from a few days   vsstable tmax 102  on ra 94  needs 2 lnc   plaquenil    ua mildly pos  rocephine  urine cxs  cad s/p stent, dm htn cont same

## 2020-04-07 NOTE — PROGRESS NOTE ADULT - SUBJECTIVE AND OBJECTIVE BOX
65 yr old female with hx of HLD, HTN, DM on lantus /novolog, stent x2 cad, cholecystectomy, JANIE/BSO presents to ed via ems sob worsening last night, coughing a lot and pleuritic chest pressure.  Pt states that for the past 2 days, she has  worsening urinary frequency and burning with pelvic pain and frequency. She denied  fever, no sick contact. Pt had fever, coughing, vomiting and diarrhea 1 wk ago and was found Covid + at [facility].      OVERNIGHT EVENTS:        REVIEW OF SYSTEMS:  CONSTITUTIONAL: No fever, chills  ENMT:  No difficulty hearing, no change in vision  NECK: No pain or stiffness  RESPIRATORY: No cough, SOB  CARDIOVASCULAR: No chest pain, palpitations  GASTROINTESTINAL: No abdominal pain. No nausea, vomiting, or diarrhea  GENITOURINARY: No dysuria  NEUROLOGICAL: No HA  SKIN: No itching, burning, rashes, or lesions   LYMPH NODES: No enlarged glands  ENDOCRINE: No heat or cold intolerance; No hair loss  MUSCULOSKELETAL: No joint pain or swelling; No muscle, back, or extremity pain  PSYCHIATRIC: No depression, anxiety  HEME/LYMPH: No easy bruising, or bleeding gums    T(C): 37.1 (20 @ 09:48), Max: 39.3 (20 @ 03:33)  HR: 95 (20 @ 09:48) (95 - 103)  BP: 141/47 (20 @ 09:48) (134/71 - 143/72)  RR: 20 (20 @ 09:48) (18 - 20)  SpO2: 95% (20 @ 09:48) (92% - 98%)  Wt(kg): --Vital Signs Last 24 Hrs  T(C): 37.1 (2020 09:48), Max: 39.3 (2020 03:33)  T(F): 98.7 (2020 09:48), Max: 102.7 (2020 03:33)  HR: 95 (2020 09:48) (95 - 103)  BP: 141/47 (2020 09:48) (134/71 - 143/72)  BP(mean): --  RR: 20 (2020 09:48) (18 - 20)  SpO2: 95% (2020 09:48) (92% - 98%)    PHYSICAL EXAM:  GENERAL: NAD  EYES: clear conjunctiva; EOMI  ENMT: Moist mucous membranes  NECK: Supple, No JVD, Normal thyroid  CHEST/LUNG: Clear to auscultation bilaterally; No rales, rhonchi, wheezing, or rubs  HEART: S1, S2, Regular rate and rhythm  ABDOMEN: Soft, Nontender, Nondistended; Bowel sounds present  NEURO: Alert & Oriented X3  EXTREMITIES: No LE edema, no calf tenderness  LYMPH: No lymphadenopathy noted  SKIN: No rashes or lesions    Consultant(s) Notes Reviewed:  [x ] YES  [ ] NO  Care Discussed with Consultants/Other Providers [ x] YES  [ ] NO    LABS:                        10.7   3.91  )-----------( 146      ( 2020 20:06 )             33.8     04-06    137  |  103  |  7   ----------------------------<  149<H>  3.6   |  27  |  1.05    Ca    7.9<L>      2020 20:06    TPro  6.4  /  Alb  2.8<L>  /  TBili  0.3  /  DBili  x   /  AST  114<H>  /  ALT  91<H>  /  AlkPhos  49  04-06      Urinalysis Basic - ( 2020 20:06 )    Color: Yellow / Appearance: Clear / S.010 / pH: x  Gluc: x / Ketone: Negative  / Bili: Negative / Urobili: 1   Blood: x / Protein: 100 / Nitrite: Positive   Leuk Esterase: Negative / RBC: 0-2 /HPF / WBC 0-2 /HPF   Sq Epi: x / Non Sq Epi: Few /HPF / Bacteria: Trace /HPF      CAPILLARY BLOOD GLUCOSE      POCT Blood Glucose.: 131 mg/dL (2020 18:57)        Urinalysis Basic - ( 2020 20:06 )    Color: Yellow / Appearance: Clear / S.010 / pH: x  Gluc: x / Ketone: Negative  / Bili: Negative / Urobili: 1   Blood: x / Protein: 100 / Nitrite: Positive   Leuk Esterase: Negative / RBC: 0-2 /HPF / WBC 0-2 /HPF   Sq Epi: x / Non Sq Epi: Few /HPF / Bacteria: Trace /HPF        RADIOLOGY & ADDITIONAL TESTS:    Imaging Personally Reviewed:  [ ] YES  [ ] NO 65 yr old female with hx of HLD, HTN, DM on lantus /novolog, stent x2 cad, cholecystectomy, JANIE/BSO presents to ed via ems sob worsening last night, coughing a lot and pleuritic chest pressure.  Pt states that for the past 2 days, she has  worsening urinary frequency and burning with pelvic pain and frequency. She denied  fever, no sick contact. Pt had fever, coughing, vomiting and diarrhea 1 wk ago and was found Covid + at [facility].      OVERNIGHT EVENTS: febrile overnight       REVIEW OF SYSTEMS:  CONSTITUTIONAL: +ve  fever, chills  RESPIRATORY: +ve cough, no SOB  CARDIOVASCULAR: No chest pain, palpitations  GASTROINTESTINAL: No abdominal pain. No nausea, vomiting, or diarrhea  GENITOURINARY: No dysuria  MUSCULOSKELETAL: No joint pain or swelling; No muscle, back, or extremity pain    T(C): 37.1 (20 @ 09:48), Max: 39.3 (20 @ 03:33)  HR: 95 (20 @ 09:48) (95 - 103)  BP: 141/47 (20 @ 09:48) (134/71 - 143/72)  RR: 20 (20 @ 09:48) (18 - 20)  SpO2: 95% (20 @ 09:48) (92% - 98%)  Wt(kg): --Vital Signs Last 24 Hrs  T(C): 37.1 (2020 09:48), Max: 39.3 (2020 03:33)  T(F): 98.7 (2020 09:48), Max: 102.7 (2020 03:33)  HR: 95 (2020 09:48) (95 - 103)  BP: 141/47 (2020 09:48) (134/71 - 143/72)  BP(mean): --  RR: 20 (2020 09:48) (18 - 20)  SpO2: 95% (2020 09:48) (92% - 98%)    MEDICATIONS  (STANDING):  ascorbic acid 500 milliGRAM(s) Oral daily  azithromycin   Tablet 500 milliGRAM(s) Oral daily  cefTRIAXone   IVPB 1000 milliGRAM(s) IV Intermittent every 24 hours  heparin  Injectable 5000 Unit(s) SubCutaneous every 8 hours  insulin lispro (HumaLOG) corrective regimen sliding scale   SubCutaneous three times a day before meals  thiamine 100 milliGRAM(s) Oral daily    MEDICATIONS  (PRN):  acetaminophen   Tablet .. 650 milliGRAM(s) Oral every 6 hours PRN Temp greater or equal to 38C (100.4F), Moderate Pain (4 - 6)  guaiFENesin   Syrup  (Sugar-Free) 100 milliGRAM(s) Oral every 6 hours PRN Cough      PHYSICAL EXAM:  GENERAL: NAD  ENMT: Moist mucous membranes  NECK: Supple, No JVD  CHEST/LUNG: Clear to auscultation bilaterally  HEART: S1, S2, Regular rate and rhythm  ABDOMEN: Soft, Nontender, Nondistended; Bowel sounds present  NEURO: Alert & Oriented X3  EXTREMITIES: No LE edema, no calf tenderness    Consultant(s) Notes Reviewed:  [x ] YES  [ ] NO  Care Discussed with Consultants/Other Providers [ x] YES  [ ] NO    LABS:                        10.7   3.91  )-----------( 146      ( 2020 20:06 )             33.8     04-    137  |  103  |  7   ----------------------------<  149<H>  3.6   |  27  |  1.05    Ca    7.9<L>      2020 20:06    TPro  6.4  /  Alb  2.8<L>  /  TBili  0.3  /  DBili  x   /  AST  114<H>  /  ALT  91<H>  /  AlkPhos  49  04-06      Urinalysis Basic - ( 2020 20:06 )    Color: Yellow / Appearance: Clear / S.010 / pH: x  Gluc: x / Ketone: Negative  / Bili: Negative / Urobili: 1   Blood: x / Protein: 100 / Nitrite: Positive   Leuk Esterase: Negative / RBC: 0-2 /HPF / WBC 0-2 /HPF   Sq Epi: x / Non Sq Epi: Few /HPF / Bacteria: Trace /HPF      CAPILLARY BLOOD GLUCOSE      POCT Blood Glucose.: 131 mg/dL (2020 18:57)      RADIOLOGY & ADDITIONAL TESTS:    < from: Xray Chest 1 View- PORTABLE-Urgent (20 @ 20:48) >    EXAM:  XR CHEST PORTABLE URGENT 1V                            PROCEDURE DATE:  2020          INTERPRETATION:  Portable chest radiograph        CLINICAL INFORMATION:   Covid-19    TECHNIQUE:  Portable  AP view of the chest was obtained.    COMPARISON: No previous examinations are available for review.    FINDINGS:   The lungs  are clear.  No pleural abnormality is seen.    The  heart is enlarged in transverse diameter. No hilar mass. Trachea midline.  There is mild vascular congestion.   Visualized osseous structures are intact.        IMPRESSION:   No evidence of active chest disease.      < end of copied text >          Imaging Personally Reviewed:  [ ] YES  [ ] NO 65 yr old female with hx of HLD, HTN, DM on lantus /novolog, stent x2 cad, cholecystectomy, JANIE/BSO presents to ed via ems sob worsening last night, coughing a lot and pleuritic chest pressure.  Pt states that for the past 2 days, she has  worsening urinary frequency and burning with pelvic pain and frequency. She denied  fever, no sick contact. Pt had fever, coughing, vomiting and diarrhea 1 wk ago and was found Covid + at [facility].  admitted for PNA and UTI.     OVERNIGHT EVENTS: febrile overnight       REVIEW OF SYSTEMS:  CONSTITUTIONAL: +ve  fever, chills  RESPIRATORY: +ve cough, no SOB  CARDIOVASCULAR: No chest pain, palpitations  GASTROINTESTINAL: No abdominal pain. No nausea, vomiting, or diarrhea  GENITOURINARY: No dysuria  MUSCULOSKELETAL: No joint pain or swelling; No muscle, back, or extremity pain    T(C): 37.1 (20 @ 09:48), Max: 39.3 (20 @ 03:33)  HR: 95 (20 @ 09:48) (95 - 103)  BP: 141/47 (20 @ 09:48) (134/71 - 143/72)  RR: 20 (20 @ 09:48) (18 - 20)  SpO2: 95% (20 @ 09:48) (92% - 98%)  Wt(kg): --Vital Signs Last 24 Hrs  T(C): 37.1 (2020 09:48), Max: 39.3 (2020 03:33)  T(F): 98.7 (2020 09:48), Max: 102.7 (2020 03:33)  HR: 95 (2020 09:48) (95 - 103)  BP: 141/47 (2020 09:48) (134/71 - 143/72)  BP(mean): --  RR: 20 (2020 09:48) (18 - 20)  SpO2: 95% (2020 09:48) (92% - 98%)    MEDICATIONS  (STANDING):  ascorbic acid 500 milliGRAM(s) Oral daily  azithromycin   Tablet 500 milliGRAM(s) Oral daily  cefTRIAXone   IVPB 1000 milliGRAM(s) IV Intermittent every 24 hours  heparin  Injectable 5000 Unit(s) SubCutaneous every 8 hours  insulin lispro (HumaLOG) corrective regimen sliding scale   SubCutaneous three times a day before meals  thiamine 100 milliGRAM(s) Oral daily    MEDICATIONS  (PRN):  acetaminophen   Tablet .. 650 milliGRAM(s) Oral every 6 hours PRN Temp greater or equal to 38C (100.4F), Moderate Pain (4 - 6)  guaiFENesin   Syrup  (Sugar-Free) 100 milliGRAM(s) Oral every 6 hours PRN Cough      PHYSICAL EXAM:  GENERAL: NAD  ENMT: Moist mucous membranes  NECK: Supple, No JVD  CHEST/LUNG: Clear to auscultation bilaterally  HEART: S1, S2, Regular rate and rhythm  ABDOMEN: Soft, Nontender, Nondistended; Bowel sounds present  NEURO: Alert & Oriented X3  EXTREMITIES: No LE edema, no calf tenderness    Consultant(s) Notes Reviewed:  [x ] YES  [ ] NO  Care Discussed with Consultants/Other Providers [ x] YES  [ ] NO    LABS:                        10.7   3.91  )-----------( 146      ( 2020 20:06 )             33.8     04-06    137  |  103  |  7   ----------------------------<  149<H>  3.6   |  27  |  1.05    Ca    7.9<L>      2020 20:06    TPro  6.4  /  Alb  2.8<L>  /  TBili  0.3  /  DBili  x   /  AST  114<H>  /  ALT  91<H>  /  AlkPhos  49  04-06      Urinalysis Basic - ( 2020 20:06 )    Color: Yellow / Appearance: Clear / S.010 / pH: x  Gluc: x / Ketone: Negative  / Bili: Negative / Urobili: 1   Blood: x / Protein: 100 / Nitrite: Positive   Leuk Esterase: Negative / RBC: 0-2 /HPF / WBC 0-2 /HPF   Sq Epi: x / Non Sq Epi: Few /HPF / Bacteria: Trace /HPF      CAPILLARY BLOOD GLUCOSE      POCT Blood Glucose.: 131 mg/dL (2020 18:57)      RADIOLOGY & ADDITIONAL TESTS:    < from: Xray Chest 1 View- PORTABLE-Urgent (20 @ 20:48) >    EXAM:  XR CHEST PORTABLE URGENT 1V                            PROCEDURE DATE:  2020          INTERPRETATION:  Portable chest radiograph        CLINICAL INFORMATION:   Covid-19    TECHNIQUE:  Portable  AP view of the chest was obtained.    COMPARISON: No previous examinations are available for review.    FINDINGS:   The lungs  are clear.  No pleural abnormality is seen.    The  heart is enlarged in transverse diameter. No hilar mass. Trachea midline.  There is mild vascular congestion.   Visualized osseous structures are intact.        IMPRESSION:   No evidence of active chest disease.      < end of copied text >          Imaging Personally Reviewed:  [ ] YES  [ ] NO

## 2020-04-07 NOTE — PROGRESS NOTE ADULT - PROBLEM SELECTOR PLAN 3
-Patient reports being Covid Positive at [facility], p/w worsening cough and shortness of breath   -Chest X-ray does not show active pulmonary disease, possible right sided infiltrate   -Follow Covid 19 PCR  -supportive care

## 2020-04-07 NOTE — ED PROVIDER NOTE - PHYSICAL EXAMINATION
no LE edema, normal equal distal pulses.   scattered coarse breath sounds, good air entry b/l. very minimal tachypnea on 2L NC, very mild resp distress.

## 2020-04-07 NOTE — ED ADULT NURSE NOTE - CCCP TRG CHIEF CMPLNT
shortness of breath How Many Skin Cancers Have You Had?: more than one What Is The Reason For Today's Visit?: Follow Up Non-Melanoma Skin Cancer

## 2020-04-07 NOTE — H&P ADULT - NSHPSOCIALHISTORY_GEN_ALL_CORE
Pt lives with her son, daughter in law and 3 grandchildren. She does not have her own bedroom.   Pt denies EtOH, tobacco and illicit drug use.

## 2020-04-07 NOTE — H&P ADULT - ASSESSMENT
64 yo F with PMHx of HTN, HLD, DM II (on insulin), Hypothyroidism, Asthma, CAD s/p PCI who was transferred to the [facility] ED on 4/7/20 from [facility] [place] with reports of fever (Tmax 102.7F), malaise, fatigue and myalgias intermittently for the past month as well as difficulty breathing, nausea, diarrhea and generalized abdominal pain for the past 3 days. Pt reports she has had a positive COVID test at another facility. Per pt's family the pt seemed confused so they brought her to the [place] ED where she was found to have a FBS of 69 and urinalysis positive for nitrites. Pt was started on Ceftriaxone and Plaquenil. 64 yo F with PMHx of HTN, HLD, DM II (on insulin), Hypothyroidism, Asthma, CAD s/p PCI who was transferred to the [facility] ED on 4/7/20 from [facility] with reports of fever (Tmax 102.7F), malaise, fatigue and myalgias intermittently for the past month as well as difficulty breathing, nausea, diarrhea and generalized abdominal pain for the past 3 days. Pt reports she has had a positive COVID test at another facility. At [place] ED pt found to have positive nitrites on UA as well as elevated biomarkers and was started on Ceftriaxone and Plaquenil. Pt is now admitted for r/o COVID. 64 yo Syriac speaking F with PMHx of HTN, HLD, DM II (on insulin), Hypothyroidism, Asthma, CAD s/p PCI who was transferred to the [facility] ED on 4/7/20 from [facility] with reports of fever (Tmax 102.7F), malaise, fatigue and myalgias intermittently for the past month as well as difficulty breathing, nausea, diarrhea and generalized abdominal pain for the past 3 days. Pt reports she has had a positive COVID test at another facility. At [place] ED pt found to have positive nitrites on UA as well as elevated biomarkers and was started on Ceftriaxone and Plaquenil. Pt is now admitted for r/o COVID.

## 2020-04-07 NOTE — ED PROVIDER NOTE - CLINICAL SUMMARY MEDICAL DECISION MAKING FREE TEXT BOX
65F PMH HLD, HTN, DM on lantus/novolog, thyroid, stent x2 cad, cholecystectomy, JANIE/BSO p/w SOB/transfer. Pt initially presented to [facility] yesterday w/ ?AMS by family, FSG 59. PT had been reportedly COVID+, w/ fever/cough, vomiting, diarrhea. Elevated ESR, mildly elevated D-dimer, LDH. mild anemia, CXR wnl. UA nitrite +. Was admitted at [facility]. Was being treated w/ ceftriaxone and plaquenil. Tmax 102.7 Pt intermittently on O2, satting well even on RA. PT transferred for [facility] decompression. Blood cx/urine cx, other inflammatory markers pending. PT c/o b/l upper back pain, body aches, SOB, fevers. No other systemic symptoms. Mild tachycardia. Satting low 90s on RA, high 90s on 2L. Other vitals wnl. Exam as above.  ddx: ?COVID, possible UTI.  W/u already in progress, will recheck COVID (CXR wnl, no [facility] documented positive). Will admit for further care.

## 2020-04-07 NOTE — H&P ADULT - PROBLEM SELECTOR PLAN 5
, ALT 87  - Transaminitis likely 2/2 COVID infection as pt without h/o liver disease.   - Continue to trend.

## 2020-04-07 NOTE — H&P ADULT - NSHPLABSRESULTS_GEN_ALL_CORE
11.1   3.16  )-----------( 169      ( 2020 12:53 )             35.9       04-07    138  |  103  |  9   ----------------------------<  278<H>  4.3   |  30  |  1.02    Ca    8.1<L>      2020 12:53  Phos  2.8     04-07  Mg     2.3     04-07    TPro  6.3  /  Alb  2.7<L>  /  TBili  0.3  /  DBili  x   /  AST  106<H>  /  ALT  87<H>  /  AlkPhos  46  04-07      PT/INR - ( 2020 12:53 )   PT: 11.5 sec;   INR: 1.02 ratio             CARDIAC MARKERS ( 2020 20:06 )  0.022 ng/mL / x     / x     / x     / x            Urinalysis Basic - ( 2020 20:06 )    Color: Yellow / Appearance: Clear / S.010 / pH: x  Gluc: x / Ketone: Negative  / Bili: Negative / Urobili: 1   Blood: x / Protein: 100 / Nitrite: Positive   Leuk Esterase: Negative / RBC: 0-2 /HPF / WBC 0-2 /HPF   Sq Epi: x / Non Sq Epi: Few /HPF / Bacteria: Trace /HPF        EKG: 11.1   3.16  )-----------( 169      ( 2020 12:53 )             35.9       04-07    138  |  103  |  9   ----------------------------<  278<H>  4.3   |  30  |  1.02    Ca    8.1<L>      2020 12:53  Phos  2.8     04-07  Mg     2.3     04-07    TPro  6.3  /  Alb  2.7<L>  /  TBili  0.3  /  DBili  x   /  AST  106<H>  /  ALT  87<H>  /  AlkPhos  46  04-07      PT/INR - ( 2020 12:53 )   PT: 11.5 sec;   INR: 1.02 ratio             CARDIAC MARKERS ( 2020 20:06 )  0.022 ng/mL / x     / x     / x     / x            Urinalysis Basic - ( 2020 20:06 )    Color: Yellow / Appearance: Clear / S.010 / pH: x  Gluc: x / Ketone: Negative  / Bili: Negative / Urobili: 1   Blood: x / Protein: 100 / Nitrite: Positive   Leuk Esterase: Negative / RBC: 0-2 /HPF / WBC 0-2 /HPF   Sq Epi: x / Non Sq Epi: Few /HPF / Bacteria: Trace /HPF

## 2020-04-07 NOTE — ED PROVIDER NOTE - OBJECTIVE STATEMENT
65F PMH HLD, HTN, DM on lantus/novolog, thyroid, stent x2 cad, cholecystectomy, JANIE/BSO p/w SOB/transfer. Pt initially presented to [facility] yesterday w/ ?AMS by family, FSG 58. PT had been reportedly COVID+, w/ fever/cough, vomiting, diarrhea. Elevated ESR, mildly elevated D-dimer, LDH. mild anemia, CXR wnl. UA nitrite +. Was admitted at [facility]. Was being treated w/ ceftriaxone and plaquenil. Tmax 102.7 Pt intermittently on O2, satting well even on RA. PT transferred for [facility] decompression. 65F PMH HLD, HTN, DM on lantus/novolog, thyroid, stent x2 cad, cholecystectomy, JANIE/BSO p/w SOB/transfer. Pt initially presented to [facility] yesterday w/ ?AMS by family, FSG 59. PT had been reportedly COVID+, w/ fever/cough, vomiting, diarrhea. Elevated ESR, mildly elevated D-dimer, LDH. mild anemia, CXR wnl. UA nitrite +. Was admitted at [facility]. Was being treated w/ ceftriaxone and plaquenil. Tmax 102.7 Pt intermittently on O2, satting well even on RA. PT transferred for [facility] decompression. Blood cx/urine cx, other inflammatory markers pending. PT c/o b/l upper back pain, body aches, SOB, fevers. Denies CP, abd pain, current NV.

## 2020-04-07 NOTE — H&P ADULT - PROBLEM SELECTOR PLAN 1
-Daily CBC/CMP/Mg/PO4/CRP/Ferritin/D-Dimer  -COVID PCR status - resulted on 4/7/20  -Azithromycin 250mg daily x5 days (started 4/7)  -Hydroxychloroquine 400mg BID x2 doses -> 200mg BID x 4 days (started 4/6/20 )  -G6PD status - [] collected, [] received, [] resulted  -QuantiFeron status - [] collected, [] received, [] resulted  -T cell subset status - [] collected, [] received, [] resulted  -Immunoglobulins status - [] collected, [] received, [] resulted  -Triglycerides status - [] collected, [] received, [] resulted  - Isolation precautions, contact and isolation  - Monitor O2 sat, monitor for respitatory distress   - NC as needed -Daily CBC/CMP/Mg/PO4/CRP/Ferritin/D-Dimer  -COVID PCR status - resulted on 4/7/20  -Azithromycin 250mg daily x5 days (started 4/7)  -Hydroxychloroquine 400mg BID x2 doses -> 200mg BID x 4 days (started 4/6/20 )  - F/u G6PD  - F/u QuantiFeron status  - F/u T cell subset status   - F/u Immunoglobulins status   - Isolation precautions, contact and isolation  - Monitor O2 sat, monitor for respiratory distress   - NC as needed

## 2020-04-07 NOTE — H&P ADULT - HISTORY OF PRESENT ILLNESS
66 yo F with PMHx of HTN, HLD, DM II (on insulin), Hypothyroidism, and CAD who was transferred to the [facility] ED on 4/7/20 from [facility] with reports of fever, malaise, fatigue, myalgias, vomiting and diarrhea for the past... Pt reports she has had a positive COVID test at another facility. Per pt's family the pt seemed confused so they brought her to the [place] ED where she was found to have a FBS of 69 and urinalysis positive for nitrites. Pt was started on Ceftriaxone and Plaquenil.     Date of onset of fever:  Date of onset of dyspnea:  Recent Travel:  Sick Contacts:  COVID Exposure:  Close Contacts:    ED Course:  Vitals:   Labs:  Imaging:  EKG:  Consults:  Interventions:    ROS:  Fevers: Y/N  Malaise: Y/N  Myalgias: Y/N  SOB: Y/N          At rest: Y/N         With exertion: Y/N         At baseline: Y/ N  Cough: Y/N         Productive: Y/N  Nausea: Y/N  Vomiting: Y/N  Diarrhea: Y/N    PMHx:   HTN: Y/N  DM: Y/N  Lung Disease: Y/N       Specify:  Cardiovascular disease: Y/N  Malignancy: Y/N  Immunosuppression: Y/N  HIV: Y/N  CKD/ESRD: Y/N  Chronic Liver Disease: Y/N    SoHx:  Tobacco use: Y/N  Vape use: Y/N  Health care worker: Y/N 64 yo F with PMHx of HTN, HLD, DM II (on insulin), Hypothyroidism, Asthma, CAD s/p PCI who was transferred to the [facility] ED on 4/7/20 from [facility] with reports of fever (Tmax 102.7F), malaise, fatigue and myalgias intermittently for the past month as well as difficulty breathing, nausea, diarrhea and generalized abdominal pain for the past 3 days. Pt reports she has had a positive COVID test at another facility. Per pt's family the pt seemed confused so they brought her to the [place] ED where she was found to have a FBS of 69 and urinalysis positive for nitrites. Pt was started on Ceftriaxone and Plaquenil.     Date of onset of fever:   Date of onset of dyspnea: 3 days ago  Recent Travel: None  Sick Contacts: Her sister  COVID Exposure: Unknown    ED Course:  Vitals: T 98.2F, , /75, RR 18, O2 sat 97% on RA   Labs: ESR 36, WBC 3/16, Lymphocytopenia 0.83, Hgb/HCT 11.1/35.9, D-dimer 257, Ferritin 485, CRP 5.41, Procalcitonin 0.9, , ALT 87,   Imaging: CXR on 4/6/20 with mild vascular congestion  EKG:      ROS:  Fevers: Y  Malaise: Y  Myalgias: Y  SOB: Y       At rest: Y       With exertion: Y       At baseline: N  Cough:N         Productive: N  Nausea: N  Vomiting: Y  Diarrhea: N    PMHx:   HTN: Y  DM: Y  Lung Disease: Y       Specify: Asthma  Cardiovascular disease: Y  Malignancy: N  Immunosuppression: N  HIV: N  CKD/ESRD: N  Chronic Liver Disease: N    SoHx:  Tobacco use: N  Vape use: N  Health care worker: N 66 yo F with PMHx of HTN, HLD, DM II (on insulin), Hypothyroidism, Asthma, CAD s/p PCI who was transferred to the [facility] ED on 4/7/20 from [facility] with reports of fever (Tmax 102.7F), malaise, fatigue and myalgias intermittently for the past month as well as difficulty breathing, nausea, diarrhea and generalized abdominal pain for the past 3 days. Pt reports she has had a positive COVID test at another facility. Per pt's family the pt seemed confused so they brought her to the [place] ED where she was found to have a FBS of 69 and urinalysis positive for nitrites. Pt was started on Ceftriaxone and Plaquenil.     Date of onset of fever: 3 days ago  Date of onset of dyspnea: 3 days ago  Recent Travel: None  Sick Contacts: Her sister  COVID Exposure: Unknown    ED Course:  Vitals: T 98.2F, , /75, RR 18, O2 sat 97% on RA   Labs: ESR 36, WBC 3/16, Lymphocytopenia 0.83, Hgb/HCT 11.1/35.9, D-dimer 257, Ferritin 485, CRP 5.41, Procalcitonin 0.9, , ALT 87,   Imaging: CXR on 4/6/20 with mild vascular congestion  EKG: Not done on admission      ROS:  Fevers: Y  Malaise: Y  Myalgias: Y  SOB: Y       At rest: Y       With exertion: Y       At baseline: N  Cough: Y        Productive: N  Nausea: N  Vomiting: Y  Diarrhea: Y    PMHx:   HTN: Y  DM: Y  Lung Disease: Y       Specify: Asthma  Cardiovascular disease: Y  Malignancy: N  Immunosuppression: N  HIV: N  CKD/ESRD: N  Chronic Liver Disease: N    SoHx:  Tobacco use: N  Vape use: N  Health care worker: N 64 yo F with PMHx of HTN, HLD, DM II (on insulin), Hypothyroidism, Asthma, CAD s/p PCI who was transferred to the [facility] ED on 4/7/20 from [facility] with reports of fever (Tmax 102.7F), malaise, fatigue and myalgias intermittently for the past month as well as difficulty breathing, nausea, diarrhea and generalized abdominal pain for the past 3 days. Pt reports she has had a positive COVID test at another facility. Per pt's family the pt seemed confused so they brought her to the [place] ED where she was found to have a FBS of 69 and urinalysis positive for nitrites. Pt was started on Ceftriaxone and Plaquenil. Pt is now admitted for r/o COVID.     Date of onset of fever: 3 days ago  Date of onset of dyspnea: 3 days ago  Recent Travel: None  Sick Contacts: Her sister  COVID Exposure: Unknown    ED Course:  Vitals: T 98.2F, , /75, RR 18, O2 sat 97% on RA   Labs: ESR 36, WBC 3/16, Lymphocytopenia 0.83, Hgb/HCT 11.1/35.9, D-dimer 257, Ferritin 485, CRP 5.41, Procalcitonin 0.9, , ALT 87,   Imaging: CXR on 4/6/20 with mild vascular congestion  EKG: Not done on admission      ROS:  Fevers: Y  Malaise: Y  Myalgias: Y  SOB: Y       At rest: Y       With exertion: Y       At baseline: N  Cough: Y        Productive: N  Nausea: N  Vomiting: Y  Diarrhea: Y    PMHx:   HTN: Y  DM: Y  Lung Disease: Y       Specify: Asthma  Cardiovascular disease: Y  Malignancy: N  Immunosuppression: N  HIV: N  CKD/ESRD: N  Chronic Liver Disease: N    SoHx:  Tobacco use: N  Vape use: N  Health care worker: N 66 yo Tamazight speaking F with PMHx of HTN, HLD, DM II (on insulin), Hypothyroidism, Asthma, CAD s/p PCI who was transferred to the [facility] ED on 4/7/20 from [facility] with reports of fever (Tmax 102.7F), malaise, fatigue and myalgias intermittently for the past month as well as difficulty breathing, nausea, diarrhea and generalized abdominal pain for the past 3 days. Pt reports she has had a positive COVID test at another facility. Per pt's family the pt seemed confused so they brought her to the [place] ED where she was found to have a FBS of 69 and urinalysis positive for nitrites. Pt was started on Ceftriaxone and Plaquenil. Pt is now admitted for r/o COVID.     Date of onset of fever: 3 days ago  Date of onset of dyspnea: 3 days ago  Recent Travel: None  Sick Contacts: Her sister  COVID Exposure: Unknown    ED Course:  Vitals: T 98.2F, , /75, RR 18, O2 sat 97% on RA   Labs: ESR 36, WBC 3/16, Lymphocytopenia 0.83, Hgb/HCT 11.1/35.9, D-dimer 257, Ferritin 485, CRP 5.41, Procalcitonin 0.9, , ALT 87,   Imaging: CXR on 4/6/20 with mild vascular congestion  EKG: Not done on admission      ROS:  Fevers: Y  Malaise: Y  Myalgias: Y  SOB: Y       At rest: Y       With exertion: Y       At baseline: N  Cough: Y        Productive: N  Nausea: N  Vomiting: Y  Diarrhea: Y    PMHx:   HTN: Y  DM: Y  Lung Disease: Y       Specify: Asthma  Cardiovascular disease: Y  Malignancy: N  Immunosuppression: N  HIV: N  CKD/ESRD: N  Chronic Liver Disease: N    SoHx:  Tobacco use: N  Vape use: N  Health care worker: N

## 2020-04-07 NOTE — PROGRESS NOTE ADULT - PROBLEM SELECTOR PLAN 2
-p/w urinary symptoms, UA is mildly positive with nitrites , no WBC  -Follow urine cultures, blood cultures   -cont Ceftriaxone

## 2020-04-07 NOTE — H&P ADULT - PROBLEM SELECTOR PLAN 7
SBP stable.   - Pt takes Losartan 100mg PO QD at home, holding in setting of possible COVID  - Continue Hydralazine 100mg PO BID  - Continue Amlodipine 10mg PO QD   - Continue Lasix 20mg PO QD  - Continue Isosorbide mononitrate 120mg PO QD

## 2020-04-08 PROBLEM — I25.10 ATHEROSCLEROTIC HEART DISEASE OF NATIVE CORONARY ARTERY WITHOUT ANGINA PECTORIS: Chronic | Status: ACTIVE | Noted: 2020-04-06

## 2020-04-08 PROBLEM — E11.9 TYPE 2 DIABETES MELLITUS WITHOUT COMPLICATIONS: Chronic | Status: ACTIVE | Noted: 2020-04-06

## 2020-04-08 LAB
ALBUMIN SERPL ELPH-MCNC: 3.2 G/DL — LOW (ref 3.3–5)
ALP SERPL-CCNC: 43 U/L — SIGNIFICANT CHANGE UP (ref 40–120)
ALT FLD-CCNC: 68 U/L — HIGH (ref 10–45)
ANION GAP SERPL CALC-SCNC: 11 MMOL/L — SIGNIFICANT CHANGE UP (ref 5–17)
AST SERPL-CCNC: 84 U/L — HIGH (ref 10–40)
BASOPHILS # BLD AUTO: 0.01 K/UL — SIGNIFICANT CHANGE UP (ref 0–0.2)
BASOPHILS NFR BLD AUTO: 0.3 % — SIGNIFICANT CHANGE UP (ref 0–2)
BILIRUB SERPL-MCNC: 0.3 MG/DL — SIGNIFICANT CHANGE UP (ref 0.2–1.2)
BUN SERPL-MCNC: 18 MG/DL — SIGNIFICANT CHANGE UP (ref 7–23)
CALCIUM SERPL-MCNC: 8.6 MG/DL — SIGNIFICANT CHANGE UP (ref 8.4–10.5)
CHLORIDE SERPL-SCNC: 100 MMOL/L — SIGNIFICANT CHANGE UP (ref 96–108)
CO2 SERPL-SCNC: 27 MMOL/L — SIGNIFICANT CHANGE UP (ref 22–31)
CREAT SERPL-MCNC: 1.37 MG/DL — HIGH (ref 0.5–1.3)
CRP SERPL-MCNC: 6.58 MG/DL — HIGH (ref 0–0.4)
CRP SERPL-MCNC: 7.02 MG/DL — HIGH (ref 0–0.4)
CULTURE RESULTS: SIGNIFICANT CHANGE UP
D DIMER BLD IA.RAPID-MCNC: 200 NG/ML DDU — SIGNIFICANT CHANGE UP
D DIMER BLD IA.RAPID-MCNC: 209 NG/ML DDU — SIGNIFICANT CHANGE UP
EOSINOPHIL # BLD AUTO: 0 K/UL — SIGNIFICANT CHANGE UP (ref 0–0.5)
EOSINOPHIL NFR BLD AUTO: 0 % — SIGNIFICANT CHANGE UP (ref 0–6)
FERRITIN SERPL-MCNC: 496 NG/ML — HIGH (ref 15–150)
FERRITIN SERPL-MCNC: 554 NG/ML — HIGH (ref 15–150)
GLUCOSE BLDC GLUCOMTR-MCNC: 120 MG/DL — HIGH (ref 70–99)
GLUCOSE BLDC GLUCOMTR-MCNC: 182 MG/DL — HIGH (ref 70–99)
GLUCOSE BLDC GLUCOMTR-MCNC: 182 MG/DL — HIGH (ref 70–99)
GLUCOSE BLDC GLUCOMTR-MCNC: 267 MG/DL — HIGH (ref 70–99)
GLUCOSE SERPL-MCNC: 145 MG/DL — HIGH (ref 70–99)
HCT VFR BLD CALC: 33.9 % — LOW (ref 34.5–45)
HGB BLD-MCNC: 10.5 G/DL — LOW (ref 11.5–15.5)
IGA FLD-MCNC: 148 MG/DL — SIGNIFICANT CHANGE UP (ref 84–499)
IGG FLD-MCNC: 868 MG/DL — SIGNIFICANT CHANGE UP (ref 610–1660)
IGM SERPL-MCNC: 36 MG/DL — SIGNIFICANT CHANGE UP (ref 35–242)
IMM GRANULOCYTES NFR BLD AUTO: 1.7 % — HIGH (ref 0–1.5)
KAPPA LC SER QL IFE: 5.06 MG/DL — HIGH (ref 0.33–1.94)
KAPPA/LAMBDA FREE LIGHT CHAIN RATIO, SERUM: 1.05 RATIO — SIGNIFICANT CHANGE UP (ref 0.26–1.65)
LAMBDA LC SER QL IFE: 4.84 MG/DL — HIGH (ref 0.57–2.63)
LYMPHOCYTES # BLD AUTO: 1.05 K/UL — SIGNIFICANT CHANGE UP (ref 1–3.3)
LYMPHOCYTES # BLD AUTO: 30.3 % — SIGNIFICANT CHANGE UP (ref 13–44)
MAGNESIUM SERPL-MCNC: 2.2 MG/DL — SIGNIFICANT CHANGE UP (ref 1.6–2.6)
MAGNESIUM SERPL-MCNC: 2.2 MG/DL — SIGNIFICANT CHANGE UP (ref 1.6–2.6)
MCHC RBC-ENTMCNC: 28.5 PG — SIGNIFICANT CHANGE UP (ref 27–34)
MCHC RBC-ENTMCNC: 31 GM/DL — LOW (ref 32–36)
MCV RBC AUTO: 92.1 FL — SIGNIFICANT CHANGE UP (ref 80–100)
MONOCYTES # BLD AUTO: 0.33 K/UL — SIGNIFICANT CHANGE UP (ref 0–0.9)
MONOCYTES NFR BLD AUTO: 9.5 % — SIGNIFICANT CHANGE UP (ref 2–14)
NEUTROPHILS # BLD AUTO: 2.01 K/UL — SIGNIFICANT CHANGE UP (ref 1.8–7.4)
NEUTROPHILS NFR BLD AUTO: 58.2 % — SIGNIFICANT CHANGE UP (ref 43–77)
NRBC # BLD: 0 /100 WBCS — SIGNIFICANT CHANGE UP (ref 0–0)
NT-PROBNP SERPL-SCNC: 185 PG/ML — SIGNIFICANT CHANGE UP (ref 0–300)
PHOSPHATE SERPL-MCNC: 2.8 MG/DL — SIGNIFICANT CHANGE UP (ref 2.5–4.5)
PHOSPHATE SERPL-MCNC: 3.5 MG/DL — SIGNIFICANT CHANGE UP (ref 2.5–4.5)
PLATELET # BLD AUTO: 175 K/UL — SIGNIFICANT CHANGE UP (ref 150–400)
POTASSIUM SERPL-MCNC: 4.7 MMOL/L — SIGNIFICANT CHANGE UP (ref 3.5–5.3)
POTASSIUM SERPL-SCNC: 4.7 MMOL/L — SIGNIFICANT CHANGE UP (ref 3.5–5.3)
PROT SERPL-MCNC: 6.3 G/DL — SIGNIFICANT CHANGE UP (ref 6–8.3)
RBC # BLD: 3.68 M/UL — LOW (ref 3.8–5.2)
RBC # FLD: 15.5 % — HIGH (ref 10.3–14.5)
SODIUM SERPL-SCNC: 138 MMOL/L — SIGNIFICANT CHANGE UP (ref 135–145)
SPECIMEN SOURCE: SIGNIFICANT CHANGE UP
WBC # BLD: 3.46 K/UL — LOW (ref 3.8–10.5)
WBC # FLD AUTO: 3.46 K/UL — LOW (ref 3.8–10.5)

## 2020-04-08 PROCEDURE — 99232 SBSQ HOSP IP/OBS MODERATE 35: CPT | Mod: GC

## 2020-04-08 RX ADMIN — FAMOTIDINE 20 MILLIGRAM(S): 10 INJECTION INTRAVENOUS at 00:08

## 2020-04-08 RX ADMIN — Medication 200 MILLIGRAM(S): at 07:06

## 2020-04-08 RX ADMIN — Medication 6: at 22:16

## 2020-04-08 RX ADMIN — Medication 500 MILLIGRAM(S): at 09:12

## 2020-04-08 RX ADMIN — GABAPENTIN 600 MILLIGRAM(S): 400 CAPSULE ORAL at 22:17

## 2020-04-08 RX ADMIN — AMLODIPINE BESYLATE 10 MILLIGRAM(S): 2.5 TABLET ORAL at 07:07

## 2020-04-08 RX ADMIN — Medication 500 MILLIGRAM(S): at 00:09

## 2020-04-08 RX ADMIN — ATORVASTATIN CALCIUM 80 MILLIGRAM(S): 80 TABLET, FILM COATED ORAL at 22:17

## 2020-04-08 RX ADMIN — BUDESONIDE AND FORMOTEROL FUMARATE DIHYDRATE 2 PUFF(S): 160; 4.5 AEROSOL RESPIRATORY (INHALATION) at 00:08

## 2020-04-08 RX ADMIN — INSULIN GLARGINE 3 UNIT(S): 100 INJECTION, SOLUTION SUBCUTANEOUS at 22:16

## 2020-04-08 RX ADMIN — BUDESONIDE AND FORMOTEROL FUMARATE DIHYDRATE 2 PUFF(S): 160; 4.5 AEROSOL RESPIRATORY (INHALATION) at 08:57

## 2020-04-08 RX ADMIN — AZITHROMYCIN 250 MILLIGRAM(S): 500 TABLET, FILM COATED ORAL at 12:53

## 2020-04-08 RX ADMIN — Medication 100 MILLIGRAM(S): at 00:08

## 2020-04-08 RX ADMIN — LORATADINE 10 MILLIGRAM(S): 10 TABLET ORAL at 12:53

## 2020-04-08 RX ADMIN — Medication 81 MILLIGRAM(S): at 12:53

## 2020-04-08 RX ADMIN — Medication 2: at 07:14

## 2020-04-08 RX ADMIN — BUDESONIDE AND FORMOTEROL FUMARATE DIHYDRATE 2 PUFF(S): 160; 4.5 AEROSOL RESPIRATORY (INHALATION) at 21:54

## 2020-04-08 RX ADMIN — Medication 100 MILLIGRAM(S): at 22:17

## 2020-04-08 RX ADMIN — ATORVASTATIN CALCIUM 80 MILLIGRAM(S): 80 TABLET, FILM COATED ORAL at 00:07

## 2020-04-08 RX ADMIN — Medication 1 MILLIGRAM(S): at 12:53

## 2020-04-08 RX ADMIN — GABAPENTIN 600 MILLIGRAM(S): 400 CAPSULE ORAL at 00:08

## 2020-04-08 RX ADMIN — ISOSORBIDE MONONITRATE 120 MILLIGRAM(S): 60 TABLET, EXTENDED RELEASE ORAL at 12:53

## 2020-04-08 RX ADMIN — ENOXAPARIN SODIUM 40 MILLIGRAM(S): 100 INJECTION SUBCUTANEOUS at 00:07

## 2020-04-08 RX ADMIN — GABAPENTIN 600 MILLIGRAM(S): 400 CAPSULE ORAL at 07:07

## 2020-04-08 RX ADMIN — Medication 75 MICROGRAM(S): at 07:06

## 2020-04-08 RX ADMIN — FAMOTIDINE 20 MILLIGRAM(S): 10 INJECTION INTRAVENOUS at 22:17

## 2020-04-08 RX ADMIN — Medication 2: at 18:41

## 2020-04-08 RX ADMIN — DONEPEZIL HYDROCHLORIDE 5 MILLIGRAM(S): 10 TABLET, FILM COATED ORAL at 00:07

## 2020-04-08 RX ADMIN — INSULIN GLARGINE 3 UNIT(S): 100 INJECTION, SOLUTION SUBCUTANEOUS at 00:07

## 2020-04-08 RX ADMIN — ENOXAPARIN SODIUM 40 MILLIGRAM(S): 100 INJECTION SUBCUTANEOUS at 22:22

## 2020-04-08 RX ADMIN — FAMOTIDINE 20 MILLIGRAM(S): 10 INJECTION INTRAVENOUS at 09:12

## 2020-04-08 RX ADMIN — GABAPENTIN 600 MILLIGRAM(S): 400 CAPSULE ORAL at 12:53

## 2020-04-08 RX ADMIN — Medication 100 MILLIGRAM(S): at 09:13

## 2020-04-08 RX ADMIN — Medication 500 MILLIGRAM(S): at 09:13

## 2020-04-08 RX ADMIN — Medication 200 MILLIGRAM(S): at 18:41

## 2020-04-08 RX ADMIN — Medication 20 MILLIGRAM(S): at 09:13

## 2020-04-08 NOTE — PROGRESS NOTE ADULT - PROBLEM SELECTOR PLAN 2
Low suspicion for superimposed bacterial PNA. Will not continue Ceftriaxone    -Management of COVID-19 as above

## 2020-04-08 NOTE — PROGRESS NOTE ADULT - PROBLEM SELECTOR PLAN 3
Presented with 3 days of diarrhea and abdominal pain. Sulfasalazine taken at home but no history of IBD per patient.    -Continue Sulfasalazine  -No concern for abdominal infection at this time

## 2020-04-08 NOTE — PROGRESS NOTE ADULT - PROBLEM SELECTOR PLAN 6
History of HTN currently well controlled. On Losartan, Hydralazine, Amlodipine, Lasix, and Imdur at home.    -Pt takes Losartan 100mg PO QD at home, holding in setting of possible COVID  -Continue Hydralazine 100mg PO BID  -Continue Amlodipine 10mg PO QD   -Continue Lasix 20mg PO QD  -Continue Isosorbide mononitrate 120mg PO QD

## 2020-04-08 NOTE — PROGRESS NOTE ADULT - PROBLEM SELECTOR PLAN 1
-COVID-19 PCR status = Positive 4/7  -Azithromycin 250mg QD x5 doses started 4/7  -Plaquenil 800mg x1 followed by 400mg QD x4 days started 4/7  -Tylenol/Albuterol/Pepcid/Lovenox/Supplemental O2  -Daily CBC/CMP/Mg/PO4/CRP/Ferritin/D-Dimer  -CRP = 5.41 -> 7.02 -> 6.58   -Ferritin = 485 -> 496 -> 554   -D-Dimer = 257 -> 200 -> 209   -Quantiferon = Pending   -G6PD = Pending

## 2020-04-08 NOTE — PROGRESS NOTE ADULT - ASSESSMENT
65-year-old female with a past medical history of HTN, HLD, DM, CAD, hypothyroidism, and asthma who presented from [place] with a month of fever, malaise, myalgias, and a 3-day history of dyspnea, diarrhea, nausea, and abdominal pain. Admitted for COVID-19 rule-out.

## 2020-04-08 NOTE — PROGRESS NOTE ADULT - SUBJECTIVE AND OBJECTIVE BOX
OVERNIGHT EVENTS: Fever in AM, otherwise ADAM    SUBJECTIVE: Patient seen and examined at the bedside. Sleepy and not very interesting in speaking or participating in an exam    Vital Signs Last 12 Hrs  T(F): 101 (20 @ 09:18), Max: 101 (20 @ 09:18)  HR: 91 (20 @ 09:18) (91 - 107)  BP: 119/67 (20 @ 09:18) (119/67 - 122/70)  BP(mean): --  RR: 18 (20 @ 05:09) (18 - 18)  SpO2: 92% (20 @ 13:32) (79% - 96%)  I&O's Summary      PHYSICAL EXAM:  General: In no acute distress, resting comfortably in bed, 2L NC   HEENT: NCAT, PERRL, EOMI, MMM  Neck: No JVD  Respiratory: Clear to auscultation bilaterally with no wheezes, rales, or rhonchi, not tachypneic  Cardiovascular: RRR, normal S1 and S2  Vascular: 2+ radial and DP pulses  Abdomen: Soft, NT/ND.  Extremities: Warm and well perfused.  Skin: No gross skin abnormalities or rashes  Neuro: AAOx3.    LABS:                        10.5   3.46  )-----------( 175      ( 2020 07:01 )             33.9     04-08    138  |  100  |  18  ----------------------------<  145<H>  4.7   |  27  |  1.37<H>    Ca    8.6      2020 13:36  Phos  3.5     04-08  Mg     2.2     04-08    TPro  6.3  /  Alb  3.2<L>  /  TBili  0.3  /  DBili  x   /  AST  84<H>  /  ALT  68<H>  /  AlkPhos  43  04-08    PT/INR - ( 2020 12:53 )   PT: 11.5 sec;   INR: 1.02 ratio           Urinalysis Basic - ( 2020 20:06 )    Color: Yellow / Appearance: Clear / S.010 / pH: x  Gluc: x / Ketone: Negative  / Bili: Negative / Urobili: 1   Blood: x / Protein: 100 / Nitrite: Positive   Leuk Esterase: Negative / RBC: 0-2 /HPF / WBC 0-2 /HPF   Sq Epi: x / Non Sq Epi: Few /HPF / Bacteria: Trace /HPF        RADIOLOGY & ADDITIONAL TESTS: Reviewed.    MEDICATIONS  (STANDING):  amLODIPine   Tablet 10 milliGRAM(s) Oral daily  aspirin enteric coated 81 milliGRAM(s) Oral daily  atorvastatin 80 milliGRAM(s) Oral at bedtime  azithromycin   Tablet 250 milliGRAM(s) Oral <User Schedule>  budesonide  80 MICROgram(s)/formoterol 4.5 MICROgram(s) Inhaler 2 Puff(s) Inhalation two times a day  dextrose 5%. 1000 milliLiter(s) (50 mL/Hr) IV Continuous <Continuous>  dextrose 50% Injectable 12.5 Gram(s) IV Push once  dextrose 50% Injectable 25 Gram(s) IV Push once  dextrose 50% Injectable 25 Gram(s) IV Push once  donepezil 5 milliGRAM(s) Oral at bedtime  enoxaparin Injectable 40 milliGRAM(s) SubCutaneous every 24 hours  famotidine    Tablet 20 milliGRAM(s) Oral two times a day  folic acid 1 milliGRAM(s) Oral daily  furosemide    Tablet 20 milliGRAM(s) Oral daily  gabapentin 600 milliGRAM(s) Oral three times a day  hydrALAZINE 100 milliGRAM(s) Oral two times a day  hydroxychloroquine 200 milliGRAM(s) Oral every 12 hours  insulin glargine Injectable (LANTUS) 3 Unit(s) SubCutaneous at bedtime  insulin lispro (HumaLOG) corrective regimen sliding scale   SubCutaneous Before meals and at bedtime  isosorbide   mononitrate ER Tablet (IMDUR) 120 milliGRAM(s) Oral daily  levothyroxine 75 MICROGram(s) Oral daily  loratadine 10 milliGRAM(s) Oral daily  sulfaSALAzine 500 milliGRAM(s) Oral two times a day    MEDICATIONS  (PRN):  acetaminophen   Tablet .. 500 milliGRAM(s) Oral every 6 hours PRN Temp greater or equal to 38C (100.4F), Moderate Pain (4 - 6)  ALBUTerol    90 MICROgram(s) HFA Inhaler 2 Puff(s) Inhalation every 6 hours PRN Wheezing  dextrose 40% Gel 15 Gram(s) Oral once PRN Blood Glucose LESS THAN 70 milliGRAM(s)/deciliter  glucagon  Injectable 1 milliGRAM(s) IntraMuscular once PRN Glucose LESS THAN 70 milligrams/deciliter      Allergies    No Known Allergies    Intolerances

## 2020-04-09 DIAGNOSIS — R30.0 DYSURIA: ICD-10-CM

## 2020-04-09 LAB
4/8 RATIO: 1.75 RATIO — SIGNIFICANT CHANGE UP (ref 0.86–4.14)
ABS CD8: 325 /UL — SIGNIFICANT CHANGE UP (ref 90–775)
ALBUMIN SERPL ELPH-MCNC: 3.1 G/DL — LOW (ref 3.3–5)
ALP SERPL-CCNC: 43 U/L — SIGNIFICANT CHANGE UP (ref 40–120)
ALT FLD-CCNC: 58 U/L — HIGH (ref 10–45)
ANION GAP SERPL CALC-SCNC: 10 MMOL/L — SIGNIFICANT CHANGE UP (ref 5–17)
APPEARANCE UR: CLEAR — SIGNIFICANT CHANGE UP
AST SERPL-CCNC: 68 U/L — HIGH (ref 10–40)
BASOPHILS # BLD AUTO: 0.01 K/UL — SIGNIFICANT CHANGE UP (ref 0–0.2)
BASOPHILS NFR BLD AUTO: 0.3 % — SIGNIFICANT CHANGE UP (ref 0–2)
BILIRUB SERPL-MCNC: 0.3 MG/DL — SIGNIFICANT CHANGE UP (ref 0.2–1.2)
BILIRUB UR-MCNC: NEGATIVE — SIGNIFICANT CHANGE UP
BUN SERPL-MCNC: 20 MG/DL — SIGNIFICANT CHANGE UP (ref 7–23)
CALCIUM SERPL-MCNC: 8.3 MG/DL — LOW (ref 8.4–10.5)
CD16+CD56+ CELLS NFR BLD: 6 % — LOW (ref 7–27)
CD16+CD56+ CELLS NFR SPEC: 74 /UL — LOW (ref 80–426)
CD19 BLASTS SPEC-ACNC: 19 % — HIGH (ref 4–18)
CD19 BLASTS SPEC-ACNC: 250 /UL — SIGNIFICANT CHANGE UP (ref 32–326)
CD3 BLASTS SPEC-ACNC: 75 % — SIGNIFICANT CHANGE UP (ref 58–84)
CD3 BLASTS SPEC-ACNC: 988 /UL — SIGNIFICANT CHANGE UP (ref 396–2024)
CD4 %: 44 % — SIGNIFICANT CHANGE UP (ref 30–56)
CD8 %: 25 % — SIGNIFICANT CHANGE UP (ref 11–43)
CHLORIDE SERPL-SCNC: 99 MMOL/L — SIGNIFICANT CHANGE UP (ref 96–108)
CO2 SERPL-SCNC: 27 MMOL/L — SIGNIFICANT CHANGE UP (ref 22–31)
COLOR SPEC: YELLOW — SIGNIFICANT CHANGE UP
CREAT SERPL-MCNC: 1.09 MG/DL — SIGNIFICANT CHANGE UP (ref 0.5–1.3)
CRP SERPL-MCNC: 4.16 MG/DL — HIGH (ref 0–0.4)
D DIMER BLD IA.RAPID-MCNC: 175 NG/ML DDU — SIGNIFICANT CHANGE UP
DIFF PNL FLD: NEGATIVE — SIGNIFICANT CHANGE UP
EOSINOPHIL # BLD AUTO: 0 K/UL — SIGNIFICANT CHANGE UP (ref 0–0.5)
EOSINOPHIL NFR BLD AUTO: 0 % — SIGNIFICANT CHANGE UP (ref 0–6)
FERRITIN SERPL-MCNC: 487 NG/ML — HIGH (ref 15–150)
GAMMA INTERFERON BACKGROUND BLD IA-ACNC: 0.18 IU/ML — SIGNIFICANT CHANGE UP
GLUCOSE BLDC GLUCOMTR-MCNC: 187 MG/DL — HIGH (ref 70–99)
GLUCOSE BLDC GLUCOMTR-MCNC: 224 MG/DL — HIGH (ref 70–99)
GLUCOSE BLDC GLUCOMTR-MCNC: 269 MG/DL — HIGH (ref 70–99)
GLUCOSE BLDC GLUCOMTR-MCNC: 285 MG/DL — HIGH (ref 70–99)
GLUCOSE SERPL-MCNC: 186 MG/DL — HIGH (ref 70–99)
GLUCOSE UR QL: 500
HCT VFR BLD CALC: 33.6 % — LOW (ref 34.5–45)
HGB BLD-MCNC: 10.6 G/DL — LOW (ref 11.5–15.5)
IMM GRANULOCYTES NFR BLD AUTO: 1.8 % — HIGH (ref 0–1.5)
KETONES UR-MCNC: NEGATIVE — SIGNIFICANT CHANGE UP
LEUKOCYTE ESTERASE UR-ACNC: NEGATIVE — SIGNIFICANT CHANGE UP
LYMPHOCYTES # BLD AUTO: 1.15 K/UL — SIGNIFICANT CHANGE UP (ref 1–3.3)
LYMPHOCYTES # BLD AUTO: 34.5 % — SIGNIFICANT CHANGE UP (ref 13–44)
M TB IFN-G BLD-IMP: NEGATIVE — SIGNIFICANT CHANGE UP
M TB IFN-G CD4+ BCKGRND COR BLD-ACNC: -0.02 IU/ML — SIGNIFICANT CHANGE UP
M TB IFN-G CD4+CD8+ BCKGRND COR BLD-ACNC: 0 IU/ML — SIGNIFICANT CHANGE UP
MAGNESIUM SERPL-MCNC: 2 MG/DL — SIGNIFICANT CHANGE UP (ref 1.6–2.6)
MCHC RBC-ENTMCNC: 29 PG — SIGNIFICANT CHANGE UP (ref 27–34)
MCHC RBC-ENTMCNC: 31.5 GM/DL — LOW (ref 32–36)
MCV RBC AUTO: 91.8 FL — SIGNIFICANT CHANGE UP (ref 80–100)
MONOCYTES # BLD AUTO: 0.37 K/UL — SIGNIFICANT CHANGE UP (ref 0–0.9)
MONOCYTES NFR BLD AUTO: 11.1 % — SIGNIFICANT CHANGE UP (ref 2–14)
NEUTROPHILS # BLD AUTO: 1.74 K/UL — LOW (ref 1.8–7.4)
NEUTROPHILS NFR BLD AUTO: 52.3 % — SIGNIFICANT CHANGE UP (ref 43–77)
NITRITE UR-MCNC: NEGATIVE — SIGNIFICANT CHANGE UP
NRBC # BLD: 0 /100 WBCS — SIGNIFICANT CHANGE UP (ref 0–0)
PH UR: 6.5 — SIGNIFICANT CHANGE UP (ref 5–8)
PHOSPHATE SERPL-MCNC: 3.5 MG/DL — SIGNIFICANT CHANGE UP (ref 2.5–4.5)
PLATELET # BLD AUTO: 187 K/UL — SIGNIFICANT CHANGE UP (ref 150–400)
POTASSIUM SERPL-MCNC: 4.1 MMOL/L — SIGNIFICANT CHANGE UP (ref 3.5–5.3)
POTASSIUM SERPL-SCNC: 4.1 MMOL/L — SIGNIFICANT CHANGE UP (ref 3.5–5.3)
PROT SERPL-MCNC: 5.9 G/DL — LOW (ref 6–8.3)
PROT UR-MCNC: NEGATIVE MG/DL — SIGNIFICANT CHANGE UP
QUANT TB PLUS MITOGEN MINUS NIL: 1.41 IU/ML — SIGNIFICANT CHANGE UP
RBC # BLD: 3.66 M/UL — LOW (ref 3.8–5.2)
RBC # FLD: 15.3 % — HIGH (ref 10.3–14.5)
SODIUM SERPL-SCNC: 136 MMOL/L — SIGNIFICANT CHANGE UP (ref 135–145)
SP GR SPEC: 1.02 — SIGNIFICANT CHANGE UP (ref 1–1.03)
T-CELL CD4 SUBSET PNL BLD: 570 /UL — SIGNIFICANT CHANGE UP (ref 325–1251)
UROBILINOGEN FLD QL: 0.2 E.U./DL — SIGNIFICANT CHANGE UP
WBC # BLD: 3.33 K/UL — LOW (ref 3.8–10.5)
WBC # FLD AUTO: 3.33 K/UL — LOW (ref 3.8–10.5)

## 2020-04-09 PROCEDURE — 93010 ELECTROCARDIOGRAM REPORT: CPT

## 2020-04-09 RX ORDER — AZITHROMYCIN 500 MG/1
1 TABLET, FILM COATED ORAL
Qty: 2 | Refills: 0
Start: 2020-04-09 | End: 2020-04-10

## 2020-04-09 RX ADMIN — FAMOTIDINE 20 MILLIGRAM(S): 10 INJECTION INTRAVENOUS at 23:42

## 2020-04-09 RX ADMIN — ISOSORBIDE MONONITRATE 120 MILLIGRAM(S): 60 TABLET, EXTENDED RELEASE ORAL at 12:05

## 2020-04-09 RX ADMIN — AZITHROMYCIN 250 MILLIGRAM(S): 500 TABLET, FILM COATED ORAL at 08:53

## 2020-04-09 RX ADMIN — FAMOTIDINE 20 MILLIGRAM(S): 10 INJECTION INTRAVENOUS at 08:54

## 2020-04-09 RX ADMIN — Medication 100 MILLIGRAM(S): at 08:54

## 2020-04-09 RX ADMIN — INSULIN GLARGINE 3 UNIT(S): 100 INJECTION, SOLUTION SUBCUTANEOUS at 23:38

## 2020-04-09 RX ADMIN — Medication 2: at 08:53

## 2020-04-09 RX ADMIN — Medication 6: at 12:30

## 2020-04-09 RX ADMIN — LORATADINE 10 MILLIGRAM(S): 10 TABLET ORAL at 12:05

## 2020-04-09 RX ADMIN — Medication 6: at 22:05

## 2020-04-09 RX ADMIN — Medication 20 MILLIGRAM(S): at 05:54

## 2020-04-09 RX ADMIN — AMLODIPINE BESYLATE 10 MILLIGRAM(S): 2.5 TABLET ORAL at 05:54

## 2020-04-09 RX ADMIN — Medication 1 MILLIGRAM(S): at 12:05

## 2020-04-09 RX ADMIN — Medication 500 MILLIGRAM(S): at 00:01

## 2020-04-09 RX ADMIN — GABAPENTIN 600 MILLIGRAM(S): 400 CAPSULE ORAL at 23:42

## 2020-04-09 RX ADMIN — DONEPEZIL HYDROCHLORIDE 5 MILLIGRAM(S): 10 TABLET, FILM COATED ORAL at 00:01

## 2020-04-09 RX ADMIN — Medication 4: at 17:32

## 2020-04-09 RX ADMIN — GABAPENTIN 600 MILLIGRAM(S): 400 CAPSULE ORAL at 05:54

## 2020-04-09 RX ADMIN — ENOXAPARIN SODIUM 40 MILLIGRAM(S): 100 INJECTION SUBCUTANEOUS at 23:41

## 2020-04-09 RX ADMIN — Medication 200 MILLIGRAM(S): at 05:54

## 2020-04-09 RX ADMIN — Medication 500 MILLIGRAM(S): at 09:09

## 2020-04-09 RX ADMIN — Medication 75 MICROGRAM(S): at 05:54

## 2020-04-09 RX ADMIN — Medication 100 MILLIGRAM(S): at 23:42

## 2020-04-09 RX ADMIN — Medication 500 MILLIGRAM(S): at 08:54

## 2020-04-09 RX ADMIN — DONEPEZIL HYDROCHLORIDE 5 MILLIGRAM(S): 10 TABLET, FILM COATED ORAL at 23:42

## 2020-04-09 RX ADMIN — ATORVASTATIN CALCIUM 80 MILLIGRAM(S): 80 TABLET, FILM COATED ORAL at 23:41

## 2020-04-09 RX ADMIN — BUDESONIDE AND FORMOTEROL FUMARATE DIHYDRATE 2 PUFF(S): 160; 4.5 AEROSOL RESPIRATORY (INHALATION) at 21:00

## 2020-04-09 RX ADMIN — BUDESONIDE AND FORMOTEROL FUMARATE DIHYDRATE 2 PUFF(S): 160; 4.5 AEROSOL RESPIRATORY (INHALATION) at 08:53

## 2020-04-09 RX ADMIN — Medication 500 MILLIGRAM(S): at 23:41

## 2020-04-09 RX ADMIN — GABAPENTIN 600 MILLIGRAM(S): 400 CAPSULE ORAL at 12:05

## 2020-04-09 RX ADMIN — Medication 81 MILLIGRAM(S): at 12:05

## 2020-04-09 NOTE — PROGRESS NOTE ADULT - PROBLEM SELECTOR PLAN 10
F: None  E: Replete Mag <2, K <4  N: DASH diet   A: Lovenox   D: Pending O2 monitoring, possible d/c on 4/9/20 F: None  E: Replete Mag <2, K <4  N: DASH diet   A: Lovenox   D: Pending O2 monitoring

## 2020-04-09 NOTE — PROGRESS NOTE ADULT - ASSESSMENT
65-year-old female with a past medical history of HTN, HLD, DM, CAD, hypothyroidism, and asthma who presented from [place] with a month of fever, malaise, myalgias, and a 3-day history of dyspnea, diarrhea, nausea, and abdominal pain. Admitted for COVID-19 rule-out.

## 2020-04-09 NOTE — PROGRESS NOTE ADULT - PROBLEM SELECTOR PLAN 2
Low suspicion for superimposed bacterial PNA. Procal 0.9. Will not continue Ceftriaxone    -Management of COVID-19 as above

## 2020-04-09 NOTE — PROGRESS NOTE ADULT - PROBLEM SELECTOR PLAN 1
-COVID-19 PCR status = Positive 4/7  -Azithromycin 250mg QD x5 doses started 4/7  -Plaquenil 800mg x1 followed by 400mg QD x4 days started 4/7  -Tylenol/Pepcid/Lovenox/Supplemental O2- weaned off O2 today, monitor symptoms   -Daily CBC/CMP/Mg/PO4/CRP/Ferritin/D-Dimer  -CRP = 5.41 -> 7.02 -> 6.58 >4.16  -Ferritin = 485 -> 496 -> 554   -D-Dimer = 257 -> 200 -> 209 >175  -Quantiferon = Pending   -G6PD = Pending

## 2020-04-09 NOTE — PROGRESS NOTE ADULT - PROBLEM SELECTOR PLAN 3
Presented with 3 days of diarrhea and abdominal pain. Sulfasalazine taken at home but no history of IBD per patient. Denies abd pain today and improved diarrhea.     -Continue Sulfasalazine  -No concern for abdominal infection at this time

## 2020-04-09 NOTE — PROGRESS NOTE ADULT - SUBJECTIVE AND OBJECTIVE BOX
Medicine Progress Note    Patient is a 65y old  Female who presents with a chief complaint of R/o COVID (09 Apr 2020 09:02)      SUBJECTIVE / OVERNIGHT EVENTS:  No acute events overnight.   Overall, feels improved. Denies shortness of breath and decreased cough. States she still feels weak and tired. Reporting dysuria. Denies abdominal pain. Infrequent diarrhea, overall improved.     ADDITIONAL REVIEW OF SYSTEMS: as above     MEDICATIONS  (STANDING):  amLODIPine   Tablet 10 milliGRAM(s) Oral daily  aspirin enteric coated 81 milliGRAM(s) Oral daily  atorvastatin 80 milliGRAM(s) Oral at bedtime  azithromycin   Tablet 250 milliGRAM(s) Oral <User Schedule>  budesonide  80 MICROgram(s)/formoterol 4.5 MICROgram(s) Inhaler 2 Puff(s) Inhalation two times a day  dextrose 5%. 1000 milliLiter(s) (50 mL/Hr) IV Continuous <Continuous>  dextrose 50% Injectable 12.5 Gram(s) IV Push once  dextrose 50% Injectable 25 Gram(s) IV Push once  dextrose 50% Injectable 25 Gram(s) IV Push once  donepezil 5 milliGRAM(s) Oral at bedtime  enoxaparin Injectable 40 milliGRAM(s) SubCutaneous every 24 hours  famotidine    Tablet 20 milliGRAM(s) Oral two times a day  folic acid 1 milliGRAM(s) Oral daily  furosemide    Tablet 20 milliGRAM(s) Oral daily  gabapentin 600 milliGRAM(s) Oral three times a day  hydrALAZINE 100 milliGRAM(s) Oral two times a day  hydroxychloroquine 200 milliGRAM(s) Oral every 12 hours  insulin glargine Injectable (LANTUS) 3 Unit(s) SubCutaneous at bedtime  insulin lispro (HumaLOG) corrective regimen sliding scale   SubCutaneous Before meals and at bedtime  isosorbide   mononitrate ER Tablet (IMDUR) 120 milliGRAM(s) Oral daily  levothyroxine 75 MICROGram(s) Oral daily  loratadine 10 milliGRAM(s) Oral daily  sulfaSALAzine 500 milliGRAM(s) Oral two times a day    MEDICATIONS  (PRN):  acetaminophen   Tablet .. 500 milliGRAM(s) Oral every 6 hours PRN Temp greater or equal to 38C (100.4F), Moderate Pain (4 - 6)  ALBUTerol    90 MICROgram(s) HFA Inhaler 2 Puff(s) Inhalation every 6 hours PRN Wheezing  dextrose 40% Gel 15 Gram(s) Oral once PRN Blood Glucose LESS THAN 70 milliGRAM(s)/deciliter  glucagon  Injectable 1 milliGRAM(s) IntraMuscular once PRN Glucose LESS THAN 70 milligrams/deciliter    CAPILLARY BLOOD GLUCOSE      POCT Blood Glucose.: 187 mg/dL (09 Apr 2020 08:19)  POCT Blood Glucose.: 267 mg/dL (08 Apr 2020 22:07)  POCT Blood Glucose.: 182 mg/dL (08 Apr 2020 18:12)  POCT Blood Glucose.: 120 mg/dL (08 Apr 2020 12:48)    I&O's Summary      PHYSICAL EXAM:  Vital Signs Last 24 Hrs  T(C): 37.2 (09 Apr 2020 05:56), Max: 38.3 (08 Apr 2020 09:18)  T(F): 98.9 (09 Apr 2020 05:56), Max: 101 (08 Apr 2020 09:18)  HR: 91 (09 Apr 2020 05:56) (91 - 96)  BP: 124/65 (09 Apr 2020 05:56) (119/67 - 124/65)  BP(mean): --  RR: 20 (09 Apr 2020 05:56) (20 - 20)  SpO2: 98% (09 Apr 2020 05:56) (79% - 98%)  CONSTITUTIONAL: NAD, well-developed, well-groomed  ENMT: Moist oral mucosa, no pharyngeal injection or exudates; normal dentition  RESPIRATORY: Normal respiratory effort, speaking full sentences   CARDIOVASCULAR: No lower extremity edema; Peripheral pulses are 2+ bilaterally  ABDOMEN: Nontender to palpation, no rebound/guarding; No hepatosplenomegaly  PSYCH: A+O to person, place, and time; affect appropriate      LABS:                        10.6   3.33  )-----------( 187      ( 09 Apr 2020 07:37 )             33.6     04-09    136  |  99  |  20  ----------------------------<  186<H>  4.1   |  27  |  1.09    Ca    8.3<L>      09 Apr 2020 07:37  Phos  3.5     04-09  Mg     2.0     04-09    TPro  5.9<L>  /  Alb  3.1<L>  /  TBili  0.3  /  DBili  x   /  AST  68<H>  /  ALT  58<H>  /  AlkPhos  43  04-09    PT/INR - ( 07 Apr 2020 12:53 )   PT: 11.5 sec;   INR: 1.02 ratio           Culture - Urine (collected 07 Apr 2020 10:35)  Source: .Urine  Final Report (08 Apr 2020 09:13):    <10,000 CFU/mL Normal Urogenital Melani    Culture - Blood (collected 07 Apr 2020 00:34)  Source: .Blood  Preliminary Report (08 Apr 2020 01:02):    No growth to date.    Culture - Blood (collected 07 Apr 2020 00:33)  Source: .Blood  Preliminary Report (08 Apr 2020 01:02):    No growth to date.      COVID-19 PCR: Detected (07 Apr 2020 17:57)      RADIOLOGY & ADDITIONAL TESTS:  Imaging from Last 24 Hours:    Electrocardiogram/QTc Interval:  < from: 12 Lead ECG (04.07.20 @ 19:19) >    Ventricular Rate 97 BPM    Atrial Rate 97 BPM    P-R Interval 112 ms    QRS Duration 122 ms    Q-T Interval 392 ms    QTC Calculation(Bezet) 497 ms    P Axis 34 degrees    R Axis 233 degrees    T Axis -9 degrees    Diagnosis Line Normal sinus rhythm  Right bundle branch block  Inferior infarct , age undetermined    Confirmed by DAVID GARAY, EMERITA ([id number]) on 4/8/2020 10:13:52 AM    < end of copied text >      COORDINATION OF CARE:  Care Discussed with Consultants/Other Providers:

## 2020-04-09 NOTE — PROGRESS NOTE ADULT - SUBJECTIVE AND OBJECTIVE BOX
OVERNIGHT EVENTS: No acute events overnight.    SUBJECTIVE/INTERVAL HPI: Patient was seen and examined at bedside.    VITALS  Vital Signs Last 24 Hrs  T(C): 37.2 (09 Apr 2020 05:56), Max: 38.3 (08 Apr 2020 09:18)  T(F): 98.9 (09 Apr 2020 05:56), Max: 101 (08 Apr 2020 09:18)  HR: 91 (09 Apr 2020 05:56) (91 - 96)  BP: 124/65 (09 Apr 2020 05:56) (119/67 - 124/65)  BP(mean): --  RR: 20 (09 Apr 2020 05:56) (20 - 20)  SpO2: 98% (09 Apr 2020 05:56) (79% - 98%)    I&O's Summary      CAPILLARY BLOOD GLUCOSE      POCT Blood Glucose.: 187 mg/dL (09 Apr 2020 08:19)  POCT Blood Glucose.: 267 mg/dL (08 Apr 2020 22:07)  POCT Blood Glucose.: 182 mg/dL (08 Apr 2020 18:12)  POCT Blood Glucose.: 120 mg/dL (08 Apr 2020 12:48)      PHYSICAL EXAM      MEDICATIONS  (STANDING):  amLODIPine   Tablet 10 milliGRAM(s) Oral daily  aspirin enteric coated 81 milliGRAM(s) Oral daily  atorvastatin 80 milliGRAM(s) Oral at bedtime  azithromycin   Tablet 250 milliGRAM(s) Oral <User Schedule>  budesonide  80 MICROgram(s)/formoterol 4.5 MICROgram(s) Inhaler 2 Puff(s) Inhalation two times a day  dextrose 5%. 1000 milliLiter(s) (50 mL/Hr) IV Continuous <Continuous>  dextrose 50% Injectable 12.5 Gram(s) IV Push once  dextrose 50% Injectable 25 Gram(s) IV Push once  dextrose 50% Injectable 25 Gram(s) IV Push once  donepezil 5 milliGRAM(s) Oral at bedtime  enoxaparin Injectable 40 milliGRAM(s) SubCutaneous every 24 hours  famotidine    Tablet 20 milliGRAM(s) Oral two times a day  folic acid 1 milliGRAM(s) Oral daily  furosemide    Tablet 20 milliGRAM(s) Oral daily  gabapentin 600 milliGRAM(s) Oral three times a day  hydrALAZINE 100 milliGRAM(s) Oral two times a day  hydroxychloroquine 200 milliGRAM(s) Oral every 12 hours  insulin glargine Injectable (LANTUS) 3 Unit(s) SubCutaneous at bedtime  insulin lispro (HumaLOG) corrective regimen sliding scale   SubCutaneous Before meals and at bedtime  isosorbide   mononitrate ER Tablet (IMDUR) 120 milliGRAM(s) Oral daily  levothyroxine 75 MICROGram(s) Oral daily  loratadine 10 milliGRAM(s) Oral daily  sulfaSALAzine 500 milliGRAM(s) Oral two times a day    MEDICATIONS  (PRN):  acetaminophen   Tablet .. 500 milliGRAM(s) Oral every 6 hours PRN Temp greater or equal to 38C (100.4F), Moderate Pain (4 - 6)  ALBUTerol    90 MICROgram(s) HFA Inhaler 2 Puff(s) Inhalation every 6 hours PRN Wheezing  dextrose 40% Gel 15 Gram(s) Oral once PRN Blood Glucose LESS THAN 70 milliGRAM(s)/deciliter  glucagon  Injectable 1 milliGRAM(s) IntraMuscular once PRN Glucose LESS THAN 70 milligrams/deciliter      LABS                        10.6   3.33  )-----------( 187      ( 09 Apr 2020 07:37 )             33.6     04-09    136  |  99  |  20  ----------------------------<  186<H>  4.1   |  27  |  1.09    Ca    8.3<L>      09 Apr 2020 07:37  Phos  3.5     04-09  Mg     2.0     04-09    TPro  5.9<L>  /  Alb  3.1<L>  /  TBili  0.3  /  DBili  x   /  AST  68<H>  /  ALT  58<H>  /  AlkPhos  43  04-09    LIVER FUNCTIONS - ( 09 Apr 2020 07:37 )  Alb: 3.1 g/dL / Pro: 5.9 g/dL / ALK PHOS: 43 U/L / ALT: 58 U/L / AST: 68 U/L / GGT: x           PT/INR - ( 07 Apr 2020 12:53 )   PT: 11.5 sec;   INR: 1.02 ratio                   Radiology and other tests: Reviewed.

## 2020-04-10 LAB
ALBUMIN SERPL ELPH-MCNC: 3.1 G/DL — LOW (ref 3.3–5)
ALP SERPL-CCNC: 48 U/L — SIGNIFICANT CHANGE UP (ref 40–120)
ALT FLD-CCNC: 63 U/L — HIGH (ref 10–45)
ANION GAP SERPL CALC-SCNC: 13 MMOL/L — SIGNIFICANT CHANGE UP (ref 5–17)
AST SERPL-CCNC: 85 U/L — HIGH (ref 10–40)
BASOPHILS # BLD AUTO: 0 K/UL — SIGNIFICANT CHANGE UP (ref 0–0.2)
BASOPHILS NFR BLD AUTO: 0 % — SIGNIFICANT CHANGE UP (ref 0–2)
BILIRUB SERPL-MCNC: 0.4 MG/DL — SIGNIFICANT CHANGE UP (ref 0.2–1.2)
BUN SERPL-MCNC: 14 MG/DL — SIGNIFICANT CHANGE UP (ref 7–23)
CALCIUM SERPL-MCNC: 8.6 MG/DL — SIGNIFICANT CHANGE UP (ref 8.4–10.5)
CHLORIDE SERPL-SCNC: 99 MMOL/L — SIGNIFICANT CHANGE UP (ref 96–108)
CO2 SERPL-SCNC: 23 MMOL/L — SIGNIFICANT CHANGE UP (ref 22–31)
CREAT SERPL-MCNC: 1.03 MG/DL — SIGNIFICANT CHANGE UP (ref 0.5–1.3)
CRP SERPL-MCNC: 2.93 MG/DL — HIGH (ref 0–0.4)
D DIMER BLD IA.RAPID-MCNC: 211 NG/ML DDU — SIGNIFICANT CHANGE UP
EOSINOPHIL # BLD AUTO: 0 K/UL — SIGNIFICANT CHANGE UP (ref 0–0.5)
EOSINOPHIL NFR BLD AUTO: 0 % — SIGNIFICANT CHANGE UP (ref 0–6)
FERRITIN SERPL-MCNC: 512 NG/ML — HIGH (ref 15–150)
GLUCOSE BLDC GLUCOMTR-MCNC: 133 MG/DL — HIGH (ref 70–99)
GLUCOSE BLDC GLUCOMTR-MCNC: 221 MG/DL — HIGH (ref 70–99)
GLUCOSE BLDC GLUCOMTR-MCNC: 307 MG/DL — HIGH (ref 70–99)
GLUCOSE BLDC GLUCOMTR-MCNC: 326 MG/DL — HIGH (ref 70–99)
GLUCOSE SERPL-MCNC: 213 MG/DL — HIGH (ref 70–99)
HCT VFR BLD CALC: 34.5 % — SIGNIFICANT CHANGE UP (ref 34.5–45)
HGB BLD-MCNC: 10.9 G/DL — LOW (ref 11.5–15.5)
LYMPHOCYTES # BLD AUTO: 0.48 K/UL — LOW (ref 1–3.3)
LYMPHOCYTES # BLD AUTO: 12.7 % — LOW (ref 13–44)
MAGNESIUM SERPL-MCNC: 2 MG/DL — SIGNIFICANT CHANGE UP (ref 1.6–2.6)
MCHC RBC-ENTMCNC: 28.5 PG — SIGNIFICANT CHANGE UP (ref 27–34)
MCHC RBC-ENTMCNC: 31.6 GM/DL — LOW (ref 32–36)
MCV RBC AUTO: 90.1 FL — SIGNIFICANT CHANGE UP (ref 80–100)
MONOCYTES # BLD AUTO: 0.14 K/UL — SIGNIFICANT CHANGE UP (ref 0–0.9)
MONOCYTES NFR BLD AUTO: 3.7 % — SIGNIFICANT CHANGE UP (ref 2–14)
NEUTROPHILS # BLD AUTO: 3.01 K/UL — SIGNIFICANT CHANGE UP (ref 1.8–7.4)
NEUTROPHILS NFR BLD AUTO: 76.4 % — SIGNIFICANT CHANGE UP (ref 43–77)
PHOSPHATE SERPL-MCNC: 3 MG/DL — SIGNIFICANT CHANGE UP (ref 2.5–4.5)
PLATELET # BLD AUTO: 190 K/UL — SIGNIFICANT CHANGE UP (ref 150–400)
POTASSIUM SERPL-MCNC: 4.1 MMOL/L — SIGNIFICANT CHANGE UP (ref 3.5–5.3)
POTASSIUM SERPL-SCNC: 4.1 MMOL/L — SIGNIFICANT CHANGE UP (ref 3.5–5.3)
PROCALCITONIN SERPL-MCNC: 0.66 NG/ML — HIGH (ref 0.02–0.1)
PROT SERPL-MCNC: 6.5 G/DL — SIGNIFICANT CHANGE UP (ref 6–8.3)
RBC # BLD: 3.83 M/UL — SIGNIFICANT CHANGE UP (ref 3.8–5.2)
RBC # FLD: 15.2 % — HIGH (ref 10.3–14.5)
SODIUM SERPL-SCNC: 135 MMOL/L — SIGNIFICANT CHANGE UP (ref 135–145)
WBC # BLD: 3.81 K/UL — SIGNIFICANT CHANGE UP (ref 3.8–10.5)
WBC # FLD AUTO: 3.81 K/UL — SIGNIFICANT CHANGE UP (ref 3.8–10.5)

## 2020-04-10 PROCEDURE — 71045 X-RAY EXAM CHEST 1 VIEW: CPT | Mod: 26

## 2020-04-10 PROCEDURE — 99232 SBSQ HOSP IP/OBS MODERATE 35: CPT | Mod: GC

## 2020-04-10 RX ORDER — HYDROXYCHLOROQUINE SULFATE 200 MG
400 TABLET ORAL DAILY
Refills: 0 | Status: COMPLETED | OUTPATIENT
Start: 2020-04-10 | End: 2020-04-11

## 2020-04-10 RX ORDER — INSULIN GLARGINE 100 [IU]/ML
13 INJECTION, SOLUTION SUBCUTANEOUS AT BEDTIME
Refills: 0 | Status: DISCONTINUED | OUTPATIENT
Start: 2020-04-10 | End: 2020-04-11

## 2020-04-10 RX ORDER — INSULIN LISPRO 100/ML
3 VIAL (ML) SUBCUTANEOUS
Refills: 0 | Status: DISCONTINUED | OUTPATIENT
Start: 2020-04-10 | End: 2020-04-11

## 2020-04-10 RX ORDER — AZITHROMYCIN 500 MG/1
250 TABLET, FILM COATED ORAL DAILY
Refills: 0 | Status: DISCONTINUED | OUTPATIENT
Start: 2020-04-10 | End: 2020-04-11

## 2020-04-10 RX ORDER — ACETAMINOPHEN 500 MG
650 TABLET ORAL EVERY 6 HOURS
Refills: 0 | Status: DISCONTINUED | OUTPATIENT
Start: 2020-04-10 | End: 2020-04-11

## 2020-04-10 RX ORDER — FUROSEMIDE 40 MG
20 TABLET ORAL DAILY
Refills: 0 | Status: DISCONTINUED | OUTPATIENT
Start: 2020-04-10 | End: 2020-04-11

## 2020-04-10 RX ADMIN — Medication 500 MILLIGRAM(S): at 09:31

## 2020-04-10 RX ADMIN — Medication 650 MILLIGRAM(S): at 17:44

## 2020-04-10 RX ADMIN — Medication 75 MICROGRAM(S): at 06:50

## 2020-04-10 RX ADMIN — Medication 400 MILLIGRAM(S): at 15:30

## 2020-04-10 RX ADMIN — Medication 8: at 13:09

## 2020-04-10 RX ADMIN — Medication 81 MILLIGRAM(S): at 12:15

## 2020-04-10 RX ADMIN — GABAPENTIN 600 MILLIGRAM(S): 400 CAPSULE ORAL at 21:57

## 2020-04-10 RX ADMIN — DONEPEZIL HYDROCHLORIDE 5 MILLIGRAM(S): 10 TABLET, FILM COATED ORAL at 21:57

## 2020-04-10 RX ADMIN — Medication 8: at 17:43

## 2020-04-10 RX ADMIN — Medication 100 MILLIGRAM(S): at 21:56

## 2020-04-10 RX ADMIN — BUDESONIDE AND FORMOTEROL FUMARATE DIHYDRATE 2 PUFF(S): 160; 4.5 AEROSOL RESPIRATORY (INHALATION) at 22:15

## 2020-04-10 RX ADMIN — ATORVASTATIN CALCIUM 80 MILLIGRAM(S): 80 TABLET, FILM COATED ORAL at 21:56

## 2020-04-10 RX ADMIN — GABAPENTIN 600 MILLIGRAM(S): 400 CAPSULE ORAL at 06:50

## 2020-04-10 RX ADMIN — Medication 500 MILLIGRAM(S): at 09:08

## 2020-04-10 RX ADMIN — Medication 3 UNIT(S): at 17:44

## 2020-04-10 RX ADMIN — FAMOTIDINE 20 MILLIGRAM(S): 10 INJECTION INTRAVENOUS at 09:32

## 2020-04-10 RX ADMIN — Medication 20 MILLIGRAM(S): at 12:15

## 2020-04-10 RX ADMIN — LORATADINE 10 MILLIGRAM(S): 10 TABLET ORAL at 12:15

## 2020-04-10 RX ADMIN — AZITHROMYCIN 250 MILLIGRAM(S): 500 TABLET, FILM COATED ORAL at 15:30

## 2020-04-10 RX ADMIN — ISOSORBIDE MONONITRATE 120 MILLIGRAM(S): 60 TABLET, EXTENDED RELEASE ORAL at 12:15

## 2020-04-10 RX ADMIN — INSULIN GLARGINE 13 UNIT(S): 100 INJECTION, SOLUTION SUBCUTANEOUS at 22:15

## 2020-04-10 RX ADMIN — Medication 1 MILLIGRAM(S): at 12:15

## 2020-04-10 RX ADMIN — ENOXAPARIN SODIUM 40 MILLIGRAM(S): 100 INJECTION SUBCUTANEOUS at 21:57

## 2020-04-10 RX ADMIN — Medication 100 MILLIGRAM(S): at 09:08

## 2020-04-10 RX ADMIN — Medication 4: at 09:07

## 2020-04-10 RX ADMIN — Medication 500 MILLIGRAM(S): at 21:57

## 2020-04-10 RX ADMIN — BUDESONIDE AND FORMOTEROL FUMARATE DIHYDRATE 2 PUFF(S): 160; 4.5 AEROSOL RESPIRATORY (INHALATION) at 09:32

## 2020-04-10 RX ADMIN — FAMOTIDINE 20 MILLIGRAM(S): 10 INJECTION INTRAVENOUS at 21:56

## 2020-04-10 RX ADMIN — AMLODIPINE BESYLATE 10 MILLIGRAM(S): 2.5 TABLET ORAL at 06:50

## 2020-04-10 RX ADMIN — GABAPENTIN 600 MILLIGRAM(S): 400 CAPSULE ORAL at 13:10

## 2020-04-10 NOTE — DIETITIAN INITIAL EVALUATION ADULT. - ENERGY NEEDS
Ht: 67" , Wt(4/7): 137# , IBW: 135# (+/-10%), 101%IBW, BMI: 21.4 kg/m2   ABW (62.1 kg) used for EER as pt between % of IBW.  Nutrient needs based on [facility] standards of care for older adults. Needs adjusted for hypermetabolic state w/ viral infection, fevers. Fluids per team.

## 2020-04-10 NOTE — PROGRESS NOTE ADULT - PROBLEM SELECTOR PLAN 10
F: None  E: Replete Mag <2, K <4  N: DASH diet   A: Lovenox   D: Pending O2 monitoring and improved symptoms

## 2020-04-10 NOTE — DIETITIAN INITIAL EVALUATION ADULT. - PERTINENT MEDS FT
enoxaparin injectable, lantus, humalog sliding scale, amlodipine, atorvastatin, donepezil, famotidine, folic acid, levothyroxine

## 2020-04-10 NOTE — PROGRESS NOTE ADULT - PROBLEM SELECTOR PLAN 8
History of DM on Lantus and Novolog at home  -MISS while inpatient  -increased lantus to 13u at bedtime given increase in ISS requirements throughout day

## 2020-04-10 NOTE — DIETITIAN INITIAL EVALUATION ADULT. - PROBLEM SELECTOR PLAN 1
-Daily CBC/CMP/Mg/PO4/CRP/Ferritin/D-Dimer  -COVID PCR status - resulted on 4/7/20  -Azithromycin 250mg daily x5 days (started 4/7)  -Hydroxychloroquine 400mg BID x2 doses -> 200mg BID x 4 days (started 4/6/20 )  - F/u G6PD  - F/u QuantiFeron status  - F/u T cell subset status   - F/u Immunoglobulins status   - Isolation precautions, contact and isolation  - Monitor O2 sat, monitor for respiratory distress   - NC as needed

## 2020-04-10 NOTE — DIETITIAN INITIAL EVALUATION ADULT. - PROBLEM SELECTOR PLAN 2
Pt with respiratory symptoms 2/2 possible COVID infection.  Procalcitonin elevated at 0.9 given procalcitonin is elevated consider coverage for CAP as well  Pt given Ceftriaxone at [place] for empiric coverage. Will continue therapy for CAP with cef and azithro

## 2020-04-10 NOTE — PROGRESS NOTE ADULT - PROBLEM SELECTOR PLAN 3
Presented with 3 days of diarrhea and abdominal pain. Sulfasalazine taken at home but no history of IBD per patient. Denies abd pain today and no diarrhea.     -Continue Sulfasalazine  -No concern for abdominal infection at this time

## 2020-04-10 NOTE — PROGRESS NOTE ADULT - PROBLEM SELECTOR PLAN 2
Low suspicion for superimposed bacterial PNA. Procal 0.9.   Found to be febrile at 9am to 101  Repeat Procal this AM pending  Plan for Repeat Chest Xray this morning, f/u results  If signs of focal consolidation, to consider treatement for superimposed PNA  -Management of COVID-19 as above

## 2020-04-10 NOTE — PROGRESS NOTE ADULT - PROBLEM SELECTOR PLAN 1
-COVID-19 PCR status = Positive 4/7  -Azithromycin 250mg QD x5 doses started 4/7  -Plaquenil 800mg x1 followed by 400mg QD x4 days started 4/7  - Azithro and Plaq stopped on 4/9 secondary to prolonged Qtc at 517, plan for repeat EKG today and if Qtc <500 plan to restart Azithro/Plaq.   -Tylenol/Pepcid/Lovenox/Supplemental O2- weaned off O2 today and O2 sat dropped to 88% thus placed back on 1L O2, continue on 1L, monitor symptoms   -Daily CBC/CMP/Mg/PO4/CRP/Ferritin/D-Dimer  -CRP = 5.41 -> 7.02 -> 6.58 >4.16>2.9  -Ferritin = 485 -> 496 -> 554 >487>512  -D-Dimer = 257 -> 200 -> 209 >175>211  -Quantiferon = NEG  -G6PD = Pending

## 2020-04-10 NOTE — DIETITIAN INITIAL EVALUATION ADULT. - MALNUTRITION
Unable to conduct a nutrition-focused physical exam due to limited contact restrictions related to pt's medical condition and isolation precautions.

## 2020-04-10 NOTE — DIETITIAN INITIAL EVALUATION ADULT. - PERTINENT LABORATORY DATA
(4/10)  (H), glucose 213 (H), AST 85 (H), ALT 63 (H), GFR 57 (H), CRP 2.93 (H) (4/9) -285 (H), (4/7) A1c 7.6 (H)

## 2020-04-10 NOTE — DIETITIAN INITIAL EVALUATION ADULT. - ADD RECOMMEND
1) Liberalize to low-sodium, CSTCHO diet to provide additional menu options in setting of decreased appetite. 2) Add Glucerna 1x/day (provides 220kcal, 10g protein, 237ml). 3) Encourage PO intake throughout the day. 4) Monitor lytes and replete prn. POC [id number]. 5) Bowel regimen per team. If diarrhea persists, recommend addition of Imodium. 6) Please trend wts.

## 2020-04-10 NOTE — PROGRESS NOTE ADULT - SUBJECTIVE AND OBJECTIVE BOX
Medicine Progress Note    Patient is a 65y old  Female who presents with a chief complaint of R/o COVID (2020 09:06)      SUBJECTIVE / OVERNIGHT EVENTS: No acute events overnight.   Patient seen at bedside this morning in full PPE, with [facility] .   Reports that she is bothered by her cough, non productive. Denies SOB. Denies fever or chills.  Denies any abdominal pain. Continues to report decreased appetite but reports that she ate a normal dinner. Denies any diarrhea. Denies fever or chills.     ADDITIONAL REVIEW OF SYSTEMS:    MEDICATIONS  (STANDING):  amLODIPine   Tablet 10 milliGRAM(s) Oral daily  aspirin enteric coated 81 milliGRAM(s) Oral daily  atorvastatin 80 milliGRAM(s) Oral at bedtime  budesonide  80 MICROgram(s)/formoterol 4.5 MICROgram(s) Inhaler 2 Puff(s) Inhalation two times a day  dextrose 5%. 1000 milliLiter(s) (50 mL/Hr) IV Continuous <Continuous>  dextrose 50% Injectable 12.5 Gram(s) IV Push once  dextrose 50% Injectable 25 Gram(s) IV Push once  dextrose 50% Injectable 25 Gram(s) IV Push once  donepezil 5 milliGRAM(s) Oral at bedtime  enoxaparin Injectable 40 milliGRAM(s) SubCutaneous every 24 hours  famotidine    Tablet 20 milliGRAM(s) Oral two times a day  folic acid 1 milliGRAM(s) Oral daily  furosemide    Tablet 20 milliGRAM(s) Oral daily  gabapentin 600 milliGRAM(s) Oral three times a day  hydrALAZINE 100 milliGRAM(s) Oral two times a day  insulin glargine Injectable (LANTUS) 3 Unit(s) SubCutaneous at bedtime  insulin lispro (HumaLOG) corrective regimen sliding scale   SubCutaneous Before meals and at bedtime  isosorbide   mononitrate ER Tablet (IMDUR) 120 milliGRAM(s) Oral daily  levothyroxine 75 MICROGram(s) Oral daily  loratadine 10 milliGRAM(s) Oral daily  sulfaSALAzine 500 milliGRAM(s) Oral two times a day    MEDICATIONS  (PRN):  acetaminophen   Tablet .. 650 milliGRAM(s) Oral every 6 hours PRN Temp greater or equal to 38C (100.4F), Mild Pain (1 - 3)  ALBUTerol    90 MICROgram(s) HFA Inhaler 2 Puff(s) Inhalation every 6 hours PRN Wheezing  dextrose 40% Gel 15 Gram(s) Oral once PRN Blood Glucose LESS THAN 70 milliGRAM(s)/deciliter  glucagon  Injectable 1 milliGRAM(s) IntraMuscular once PRN Glucose LESS THAN 70 milligrams/deciliter  guaiFENesin   Syrup  (Sugar-Free) 100 milliGRAM(s) Oral every 6 hours PRN Cough    CAPILLARY BLOOD GLUCOSE      POCT Blood Glucose.: 221 mg/dL (10 Apr 2020 08:46)  POCT Blood Glucose.: 269 mg/dL (2020 21:09)  POCT Blood Glucose.: 224 mg/dL (2020 17:12)  POCT Blood Glucose.: 285 mg/dL (2020 12:23)    I&O's Summary      PHYSICAL EXAM:  Vital Signs Last 24 Hrs  T(C): 38.3 (10 Apr 2020 09:29), Max: 38.3 (10 Apr 2020 09:29)  T(F): 101 (10 Apr 2020 09:29), Max: 101 (10 Apr 2020 09:29)  HR: 110 (10 Apr 2020 09:29) (98 - 110)  BP: 137/61 (10 Apr 2020 09:29) (109/61 - 137/61)  BP(mean): --  RR: 25 (10 Apr 2020 09:30) (18 - 25)  SpO2: 83% (10 Apr 2020 09:30) (83% - 99%)  CONSTITUTIONAL: NAD, well-developed, resting in bed on 1L  ENMT: Moist oral mucosa, no pharyngeal injection or exudates; normal dentition  RESPIRATORY: Normal respiratory effort; completing full sentences   EXT: no edema/erythema/tenderness  ABDOMEN: Nontender to palpation, no rebound/guarding; No hepatosplenomegaly  PSYCH: A+O to person, place, and time; affect appropriate  SKIN: No rashes; no palpable lesions    LABS:                        10.9   3.81  )-----------( 190      ( 10 Apr 2020 07:32 )             34.5     04-10    135  |  99  |  14  ----------------------------<  213<H>  4.1   |  23  |  1.03    Ca    8.6      10 Apr 2020 07:32  Phos  3.0     04-10  Mg     2.0     04-10    TPro  6.5  /  Alb  3.1<L>  /  TBili  0.4  /  DBili  x   /  AST  85<H>  /  ALT  63<H>  /  AlkPhos  48  04-10          Urinalysis Basic - ( 2020 18:13 )    Color: Yellow / Appearance: Clear / S.025 / pH: x  Gluc: x / Ketone: NEGATIVE  / Bili: Negative / Urobili: 0.2 E.U./dL   Blood: x / Protein: NEGATIVE mg/dL / Nitrite: NEGATIVE   Leuk Esterase: NEGATIVE / RBC: x / WBC x   Sq Epi: x / Non Sq Epi: x / Bacteria: x        COVID-19 PCR: Detected (2020 17:57)      RADIOLOGY & ADDITIONAL TESTS:  Imaging from Last 24 Hours:    Electrocardiogram/QTc Interval:    COORDINATION OF CARE:  Care Discussed with Consultants/Other Providers:

## 2020-04-10 NOTE — PROGRESS NOTE ADULT - ASSESSMENT
65-year-old female with a past medical history of HTN, HLD, DM, CAD, hypothyroidism, and asthma who presented from [place] with a month of fever, malaise, myalgias, and a 3-day history of dyspnea, diarrhea, nausea, and abdominal pain. Admitted for COVID-19 rule-out.

## 2020-04-10 NOTE — DIETITIAN INITIAL EVALUATION ADULT. - PROBLEM SELECTOR PLAN 3
Pt asymptomatic.   UA positive for nitrites, small blood and small bacteria.  - Pt given Ceftriaxone which will cover for possible CAP and for cystitis.   - Continue to monitor

## 2020-04-10 NOTE — DIETITIAN INITIAL EVALUATION ADULT. - DIET TYPE
Diet, DASH/TLC:   Sodium & Cholesterol Restricted  Consistent Carbohydrate {No Snacks} (CSTCHO) (04-07-20 @ 21:26) consistent carbohydrate (no snacks)/low sodium

## 2020-04-10 NOTE — DIETITIAN INITIAL EVALUATION ADULT. - OTHER INFO
Pt is a 65 y.o F with PMHx significant for HTN, HLD, DM, CAD, hypothyroidism, and asthma who presented from [place] with a month of fever, malaise, myalgias, and a 3-day history of dyspnea, diarrhea, nausea, and abdominal pain. Admitted for COVID-19 rule-out, found to be positive.    Unable to conduct a face to face interview due to limited contact restrictions related to pt's medical condition and isolation precautions. Interview conducted via phone with pt's son ([facility] #[phone]). Per son, pt w/ fair appetite at baseline PTA though she has been eating less x 1 week. Typically consumes 3 meals/day, meals prepared by son/daughter-in-law. Checks blood glucose levels 3x/daym pre-prandial w/ insulin regimen. Confirms NKFA. Denies chewing/swallowing difficulties. Unclear UBW though reporting possible wt loss x 2 weeks. Pt noted w/ admit wt 137# (4/7). Please continue to monitor wt trends. Pt is currently on DASH/TLC, CSTCHO diet, tolerating. Per team, pt reported continued decreased appetite but reported that she ate a normal dinner yesterday. No N/V/C though noted w/ +diarrhea per chart (likely side effects r/t COVID). +BM 4/10. Skin: intact pressure-wise. Discussed purpose of current diet order and obtained pt's food preferences; Will honor as able. RD to f/u per protocol. Pt is a 65 y.o F with PMHx significant for HTN, HLD, DM, CAD, hypothyroidism, and asthma who presented from [place] with a month of fever, malaise, myalgias, and a 3-day history of dyspnea, diarrhea, nausea, and abdominal pain. Admitted for COVID-19 rule-out, found to be positive.    Unable to conduct a face to face interview due to limited contact restrictions related to pt's medical condition and isolation precautions. Interview conducted via phone with pt's son ([facility] #[phone]). Per son, pt w/ fair appetite at baseline PTA though she has been eating less x 1 week. Typically consumes 3 meals/day, meals prepared by son/daughter-in-law. Checks blood glucose levels 3x/daym pre-prandial w/ insulin regimen. Confirms NKFA. Denies chewing/swallowing difficulties. Unclear UBW though reporting possible wt loss x 2 weeks. Pt noted w/ admit wt 137# (4/7). Please continue to monitor wt trends. Pt is currently on DASH/TLC, CSTCHO diet, tolerating. Per team, pt reported continued decreased appetite but reported that she ate a normal dinner yesterday. No N/V/C though noted w/ +diarrhea per chart (likely side effects r/t COVID). +BM 4/10. Skin: intact pressure-wise. Discussed purpose of current diet order and obtained pt's food preferences; Will honor as able. Please see below for full nutrition recs. Pending order placed. RD to f/u per protocol.

## 2020-04-11 VITALS
SYSTOLIC BLOOD PRESSURE: 105 MMHG | HEART RATE: 96 BPM | DIASTOLIC BLOOD PRESSURE: 57 MMHG | RESPIRATION RATE: 18 BRPM | TEMPERATURE: 98 F | OXYGEN SATURATION: 97 %

## 2020-04-11 LAB
-  AMPICILLIN: SIGNIFICANT CHANGE UP
-  AMPICILLIN: SIGNIFICANT CHANGE UP
-  CIPROFLOXACIN: SIGNIFICANT CHANGE UP
-  CIPROFLOXACIN: SIGNIFICANT CHANGE UP
-  LEVOFLOXACIN: SIGNIFICANT CHANGE UP
-  LEVOFLOXACIN: SIGNIFICANT CHANGE UP
-  NITROFURANTOIN: SIGNIFICANT CHANGE UP
-  NITROFURANTOIN: SIGNIFICANT CHANGE UP
-  TETRACYCLINE: SIGNIFICANT CHANGE UP
-  TETRACYCLINE: SIGNIFICANT CHANGE UP
-  VANCOMYCIN: SIGNIFICANT CHANGE UP
-  VANCOMYCIN: SIGNIFICANT CHANGE UP
ALBUMIN SERPL ELPH-MCNC: 3.2 G/DL — LOW (ref 3.3–5)
ALP SERPL-CCNC: 47 U/L — SIGNIFICANT CHANGE UP (ref 40–120)
ALT FLD-CCNC: 71 U/L — HIGH (ref 10–45)
ANION GAP SERPL CALC-SCNC: 13 MMOL/L — SIGNIFICANT CHANGE UP (ref 5–17)
AST SERPL-CCNC: 81 U/L — HIGH (ref 10–40)
BASOPHILS # BLD AUTO: 0.04 K/UL — SIGNIFICANT CHANGE UP (ref 0–0.2)
BASOPHILS NFR BLD AUTO: 0.9 % — SIGNIFICANT CHANGE UP (ref 0–2)
BILIRUB SERPL-MCNC: 0.4 MG/DL — SIGNIFICANT CHANGE UP (ref 0.2–1.2)
BUN SERPL-MCNC: 10 MG/DL — SIGNIFICANT CHANGE UP (ref 7–23)
CALCIUM SERPL-MCNC: 8.7 MG/DL — SIGNIFICANT CHANGE UP (ref 8.4–10.5)
CHLORIDE SERPL-SCNC: 93 MMOL/L — LOW (ref 96–108)
CO2 SERPL-SCNC: 24 MMOL/L — SIGNIFICANT CHANGE UP (ref 22–31)
CREAT SERPL-MCNC: 0.99 MG/DL — SIGNIFICANT CHANGE UP (ref 0.5–1.3)
CRP SERPL-MCNC: 3.26 MG/DL — HIGH (ref 0–0.4)
CULTURE RESULTS: SIGNIFICANT CHANGE UP
D DIMER BLD IA.RAPID-MCNC: <150 NG/ML DDU — SIGNIFICANT CHANGE UP
EOSINOPHIL # BLD AUTO: 0 K/UL — SIGNIFICANT CHANGE UP (ref 0–0.5)
EOSINOPHIL NFR BLD AUTO: 0 % — SIGNIFICANT CHANGE UP (ref 0–6)
FERRITIN SERPL-MCNC: 532 NG/ML — HIGH (ref 15–150)
G6PD RBC-CCNC: 14.8 U/G HGB — SIGNIFICANT CHANGE UP (ref 7–20.5)
GLUCOSE BLDC GLUCOMTR-MCNC: 224 MG/DL — HIGH (ref 70–99)
GLUCOSE BLDC GLUCOMTR-MCNC: 269 MG/DL — HIGH (ref 70–99)
GLUCOSE SERPL-MCNC: 327 MG/DL — HIGH (ref 70–99)
HCT VFR BLD CALC: 33.7 % — LOW (ref 34.5–45)
HGB BLD-MCNC: 10.5 G/DL — LOW (ref 11.5–15.5)
LYMPHOCYTES # BLD AUTO: 0.49 K/UL — LOW (ref 1–3.3)
LYMPHOCYTES # BLD AUTO: 10.9 % — LOW (ref 13–44)
MAGNESIUM SERPL-MCNC: 2 MG/DL — SIGNIFICANT CHANGE UP (ref 1.6–2.6)
MCHC RBC-ENTMCNC: 28.2 PG — SIGNIFICANT CHANGE UP (ref 27–34)
MCHC RBC-ENTMCNC: 31.2 GM/DL — LOW (ref 32–36)
MCV RBC AUTO: 90.3 FL — SIGNIFICANT CHANGE UP (ref 80–100)
METHOD TYPE: SIGNIFICANT CHANGE UP
MONOCYTES # BLD AUTO: 0.45 K/UL — SIGNIFICANT CHANGE UP (ref 0–0.9)
MONOCYTES NFR BLD AUTO: 10 % — SIGNIFICANT CHANGE UP (ref 2–14)
NEUTROPHILS # BLD AUTO: 3.06 K/UL — SIGNIFICANT CHANGE UP (ref 1.8–7.4)
NEUTROPHILS NFR BLD AUTO: 68.2 % — SIGNIFICANT CHANGE UP (ref 43–77)
NRBC # BLD: SIGNIFICANT CHANGE UP /100 WBCS (ref 0–0)
ORGANISM # SPEC MICROSCOPIC CNT: SIGNIFICANT CHANGE UP
PHOSPHATE SERPL-MCNC: 3 MG/DL — SIGNIFICANT CHANGE UP (ref 2.5–4.5)
PLATELET # BLD AUTO: 220 K/UL — SIGNIFICANT CHANGE UP (ref 150–400)
POTASSIUM SERPL-MCNC: 4 MMOL/L — SIGNIFICANT CHANGE UP (ref 3.5–5.3)
POTASSIUM SERPL-SCNC: 4 MMOL/L — SIGNIFICANT CHANGE UP (ref 3.5–5.3)
PROT SERPL-MCNC: 6.5 G/DL — SIGNIFICANT CHANGE UP (ref 6–8.3)
RBC # BLD: 3.73 M/UL — LOW (ref 3.8–5.2)
RBC # FLD: 15 % — HIGH (ref 10.3–14.5)
SODIUM SERPL-SCNC: 130 MMOL/L — LOW (ref 135–145)
SPECIMEN SOURCE: SIGNIFICANT CHANGE UP
WBC # BLD: 4.49 K/UL — SIGNIFICANT CHANGE UP (ref 3.8–10.5)
WBC # FLD AUTO: 4.49 K/UL — SIGNIFICANT CHANGE UP (ref 3.8–10.5)

## 2020-04-11 PROCEDURE — 81003 URINALYSIS AUTO W/O SCOPE: CPT

## 2020-04-11 PROCEDURE — 82784 ASSAY IGA/IGD/IGG/IGM EACH: CPT

## 2020-04-11 PROCEDURE — 83735 ASSAY OF MAGNESIUM: CPT

## 2020-04-11 PROCEDURE — 84100 ASSAY OF PHOSPHORUS: CPT

## 2020-04-11 PROCEDURE — 85025 COMPLETE CBC W/AUTO DIFF WBC: CPT

## 2020-04-11 PROCEDURE — 82962 GLUCOSE BLOOD TEST: CPT

## 2020-04-11 PROCEDURE — 82728 ASSAY OF FERRITIN: CPT

## 2020-04-11 PROCEDURE — 83880 ASSAY OF NATRIURETIC PEPTIDE: CPT

## 2020-04-11 PROCEDURE — 82955 ASSAY OF G6PD ENZYME: CPT

## 2020-04-11 PROCEDURE — 99285 EMERGENCY DEPT VISIT HI MDM: CPT

## 2020-04-11 PROCEDURE — 71045 X-RAY EXAM CHEST 1 VIEW: CPT

## 2020-04-11 PROCEDURE — 94640 AIRWAY INHALATION TREATMENT: CPT

## 2020-04-11 PROCEDURE — 85379 FIBRIN DEGRADATION QUANT: CPT

## 2020-04-11 PROCEDURE — 84145 PROCALCITONIN (PCT): CPT

## 2020-04-11 PROCEDURE — 80053 COMPREHEN METABOLIC PANEL: CPT

## 2020-04-11 PROCEDURE — 36415 COLL VENOUS BLD VENIPUNCTURE: CPT

## 2020-04-11 PROCEDURE — 86140 C-REACTIVE PROTEIN: CPT

## 2020-04-11 PROCEDURE — 87186 SC STD MICRODIL/AGAR DIL: CPT

## 2020-04-11 PROCEDURE — 93005 ELECTROCARDIOGRAM TRACING: CPT

## 2020-04-11 PROCEDURE — 87086 URINE CULTURE/COLONY COUNT: CPT

## 2020-04-11 PROCEDURE — 86480 TB TEST CELL IMMUN MEASURE: CPT

## 2020-04-11 PROCEDURE — 87635 SARS-COV-2 COVID-19 AMP PRB: CPT

## 2020-04-11 RX ORDER — ALBUTEROL 90 UG/1
2 AEROSOL, METERED ORAL
Qty: 0 | Refills: 0 | DISCHARGE

## 2020-04-11 RX ORDER — BUDESONIDE AND FORMOTEROL FUMARATE DIHYDRATE 160; 4.5 UG/1; UG/1
2 AEROSOL RESPIRATORY (INHALATION)
Qty: 0 | Refills: 0 | DISCHARGE

## 2020-04-11 RX ORDER — FUROSEMIDE 40 MG
1 TABLET ORAL
Qty: 0 | Refills: 0 | DISCHARGE

## 2020-04-11 RX ORDER — GABAPENTIN 400 MG/1
1 CAPSULE ORAL
Qty: 0 | Refills: 0 | DISCHARGE

## 2020-04-11 RX ORDER — INSULIN GLARGINE 100 [IU]/ML
3 INJECTION, SOLUTION SUBCUTANEOUS
Qty: 0 | Refills: 0 | DISCHARGE

## 2020-04-11 RX ORDER — GABAPENTIN 400 MG/1
2 CAPSULE ORAL
Qty: 30 | Refills: 0
Start: 2020-04-11 | End: 2020-04-15

## 2020-04-11 RX ORDER — AMLODIPINE BESYLATE 2.5 MG/1
1 TABLET ORAL
Qty: 0 | Refills: 0 | DISCHARGE

## 2020-04-11 RX ORDER — HYDRALAZINE HCL 50 MG
1 TABLET ORAL
Qty: 10 | Refills: 0
Start: 2020-04-11 | End: 2020-04-15

## 2020-04-11 RX ORDER — ASPIRIN/CALCIUM CARB/MAGNESIUM 324 MG
1 TABLET ORAL
Qty: 5 | Refills: 0
Start: 2020-04-11 | End: 2020-04-15

## 2020-04-11 RX ORDER — HYDRALAZINE HCL 50 MG
4 TABLET ORAL
Qty: 40 | Refills: 0
Start: 2020-04-11 | End: 2020-04-15

## 2020-04-11 RX ORDER — FOLIC ACID 0.8 MG
1 TABLET ORAL
Qty: 5 | Refills: 0
Start: 2020-04-11 | End: 2020-04-15

## 2020-04-11 RX ORDER — DONEPEZIL HYDROCHLORIDE 10 MG/1
1 TABLET, FILM COATED ORAL
Qty: 5 | Refills: 0
Start: 2020-04-11 | End: 2020-04-15

## 2020-04-11 RX ORDER — ATORVASTATIN CALCIUM 80 MG/1
1 TABLET, FILM COATED ORAL
Qty: 0 | Refills: 0 | DISCHARGE

## 2020-04-11 RX ORDER — LEVOTHYROXINE SODIUM 125 MCG
1 TABLET ORAL
Qty: 5 | Refills: 0
Start: 2020-04-11 | End: 2020-04-15

## 2020-04-11 RX ORDER — LORATADINE 10 MG/1
1 TABLET ORAL
Qty: 5 | Refills: 0
Start: 2020-04-11 | End: 2020-04-15

## 2020-04-11 RX ORDER — NITROFURANTOIN MACROCRYSTAL 50 MG
1 CAPSULE ORAL
Qty: 10 | Refills: 0
Start: 2020-04-11 | End: 2020-04-15

## 2020-04-11 RX ORDER — LOSARTAN POTASSIUM 100 MG/1
1 TABLET, FILM COATED ORAL
Qty: 0 | Refills: 0 | DISCHARGE

## 2020-04-11 RX ORDER — ISOSORBIDE MONONITRATE 60 MG/1
1 TABLET, EXTENDED RELEASE ORAL
Qty: 5 | Refills: 0
Start: 2020-04-11 | End: 2020-04-15

## 2020-04-11 RX ORDER — SULFASALAZINE 500 MG
2 TABLET ORAL
Qty: 0 | Refills: 0 | DISCHARGE

## 2020-04-11 RX ORDER — METHOTREXATE 2.5 MG/1
1 TABLET ORAL
Qty: 0 | Refills: 0 | DISCHARGE

## 2020-04-11 RX ORDER — ISOSORBIDE MONONITRATE 60 MG/1
2 TABLET, EXTENDED RELEASE ORAL
Qty: 10 | Refills: 0
Start: 2020-04-11 | End: 2020-04-15

## 2020-04-11 RX ORDER — SULFASALAZINE 500 MG
1 TABLET ORAL
Qty: 10 | Refills: 0
Start: 2020-04-11 | End: 2020-04-15

## 2020-04-11 RX ORDER — LEVOTHYROXINE SODIUM 125 MCG
1 TABLET ORAL
Qty: 0 | Refills: 0 | DISCHARGE

## 2020-04-11 RX ORDER — DONEPEZIL HYDROCHLORIDE 10 MG/1
1 TABLET, FILM COATED ORAL
Qty: 0 | Refills: 0 | DISCHARGE

## 2020-04-11 RX ORDER — ATORVASTATIN CALCIUM 80 MG/1
1 TABLET, FILM COATED ORAL
Qty: 5 | Refills: 0
Start: 2020-04-11 | End: 2020-04-15

## 2020-04-11 RX ORDER — INSULIN ASPART 100 [IU]/ML
0 INJECTION, SOLUTION SUBCUTANEOUS
Qty: 0 | Refills: 0 | DISCHARGE

## 2020-04-11 RX ORDER — INSULIN GLARGINE 100 [IU]/ML
20 INJECTION, SOLUTION SUBCUTANEOUS
Qty: 1 | Refills: 0
Start: 2020-04-11 | End: 2020-04-15

## 2020-04-11 RX ORDER — FOLIC ACID 0.8 MG
1 TABLET ORAL
Qty: 0 | Refills: 0 | DISCHARGE

## 2020-04-11 RX ORDER — INSULIN LISPRO 100/ML
5 VIAL (ML) SUBCUTANEOUS
Qty: 1 | Refills: 0
Start: 2020-04-11 | End: 2020-04-15

## 2020-04-11 RX ORDER — HYDRALAZINE HCL 50 MG
1 TABLET ORAL
Qty: 0 | Refills: 0 | DISCHARGE

## 2020-04-11 RX ORDER — FUROSEMIDE 40 MG
1 TABLET ORAL
Qty: 5 | Refills: 0
Start: 2020-04-11 | End: 2020-04-15

## 2020-04-11 RX ORDER — FAMOTIDINE 10 MG/ML
1 INJECTION INTRAVENOUS
Qty: 10 | Refills: 0
Start: 2020-04-11 | End: 2020-04-15

## 2020-04-11 RX ORDER — AMLODIPINE BESYLATE 2.5 MG/1
1 TABLET ORAL
Qty: 5 | Refills: 0
Start: 2020-04-11 | End: 2020-04-15

## 2020-04-11 RX ORDER — ASPIRIN/CALCIUM CARB/MAGNESIUM 324 MG
1 TABLET ORAL
Qty: 0 | Refills: 0 | DISCHARGE

## 2020-04-11 RX ORDER — BUDESONIDE AND FORMOTEROL FUMARATE DIHYDRATE 160; 4.5 UG/1; UG/1
2 AEROSOL RESPIRATORY (INHALATION)
Qty: 5 | Refills: 0
Start: 2020-04-11 | End: 2020-04-15

## 2020-04-11 RX ORDER — ALBUTEROL 90 UG/1
2 AEROSOL, METERED ORAL
Qty: 1 | Refills: 0
Start: 2020-04-11 | End: 2020-04-15

## 2020-04-11 RX ORDER — ISOSORBIDE MONONITRATE 60 MG/1
1 TABLET, EXTENDED RELEASE ORAL
Qty: 0 | Refills: 0 | DISCHARGE

## 2020-04-11 RX ORDER — GABAPENTIN 400 MG/1
1 CAPSULE ORAL
Qty: 15 | Refills: 0
Start: 2020-04-11 | End: 2020-04-15

## 2020-04-11 RX ORDER — LORATADINE 10 MG/1
1 TABLET ORAL
Qty: 0 | Refills: 0 | DISCHARGE

## 2020-04-11 RX ADMIN — LORATADINE 10 MILLIGRAM(S): 10 TABLET ORAL at 11:06

## 2020-04-11 RX ADMIN — Medication 20 MILLIGRAM(S): at 06:26

## 2020-04-11 RX ADMIN — AMLODIPINE BESYLATE 10 MILLIGRAM(S): 2.5 TABLET ORAL at 06:26

## 2020-04-11 RX ADMIN — ISOSORBIDE MONONITRATE 120 MILLIGRAM(S): 60 TABLET, EXTENDED RELEASE ORAL at 11:06

## 2020-04-11 RX ADMIN — Medication 3 UNIT(S): at 09:05

## 2020-04-11 RX ADMIN — Medication 3 UNIT(S): at 12:56

## 2020-04-11 RX ADMIN — Medication 4: at 09:05

## 2020-04-11 RX ADMIN — Medication 1 MILLIGRAM(S): at 11:06

## 2020-04-11 RX ADMIN — GABAPENTIN 600 MILLIGRAM(S): 400 CAPSULE ORAL at 11:06

## 2020-04-11 RX ADMIN — Medication 81 MILLIGRAM(S): at 11:06

## 2020-04-11 RX ADMIN — Medication 100 MILLIGRAM(S): at 09:06

## 2020-04-11 RX ADMIN — Medication 500 MILLIGRAM(S): at 09:06

## 2020-04-11 RX ADMIN — FAMOTIDINE 20 MILLIGRAM(S): 10 INJECTION INTRAVENOUS at 09:05

## 2020-04-11 RX ADMIN — Medication 400 MILLIGRAM(S): at 11:06

## 2020-04-11 RX ADMIN — GABAPENTIN 600 MILLIGRAM(S): 400 CAPSULE ORAL at 06:24

## 2020-04-11 RX ADMIN — BUDESONIDE AND FORMOTEROL FUMARATE DIHYDRATE 2 PUFF(S): 160; 4.5 AEROSOL RESPIRATORY (INHALATION) at 09:04

## 2020-04-11 RX ADMIN — Medication 6: at 12:56

## 2020-04-11 RX ADMIN — Medication 75 MICROGRAM(S): at 06:26

## 2020-04-11 NOTE — DISCHARGE NOTE PROVIDER - NSDCFUADDAPPT_GEN_ALL_CORE_FT
Please follow-up with your primary care physician regarding your hospitalization for COVID-19, urinary tract infection, and high blood pressure

## 2020-04-11 NOTE — DISCHARGE NOTE PROVIDER - NSDCCPCAREPLAN_GEN_ALL_CORE_FT
PRINCIPAL DISCHARGE DIAGNOSIS  Diagnosis: SARS-associated coronavirus infection  Assessment and Plan of Treatment: You were admitted to [facility] with and tested positivefor COVID-19. This test resulted from a nasal and oral (mouth) swab that was done earlier in your admission to [facility]. Your symptoms improved during your hospital stay.  You received 2 days of azithromycin and plaquenil, but was discontinued due to concern for cardiac arrhythmias (rhythm problems with your heart). The treatment is supportive care, which means: rest, hydration, and maintaining a good healthy diet. You may take Tylenol if you experience any fevers and Robitussin for cough. Do not take Advil or Motrin. Please take your prescribed medications listed below as directed.  If you start experiencing extreme shortness of breath, difficulty breathing resulting in the inability to even walk, please visit an urgent care. Please maintain a 14 day quarantine at home and until no fevers for at least 3 days (4/21/2020). Should you have to leave your residence or come in contact with others, please wear a face mask and wash your hands thoroughly. The department of health will follow-up with you via phone, please do not go to your PCP while in self quarantine.   Wear a mask at all times should you have to be outdoors briefly - and avoid crowds, public spaces, and mass transit at all times during this quarantine.   Continue to wash your hands thoroughly and frequently. Please see the supplemented written instructions for further use.  Please follow-up with your primary care physician regularly after your discharge and self-quarantine (4/21/2020)      SECONDARY DISCHARGE DIAGNOSES  Diagnosis: Hypertension  Assessment and Plan of Treatment: You have a known history of high blood pressure prior to your admission.   Continue taking  - hydralazine 100mg two times a day  - imdur 120mg oncea  day  - lasix 20mg oncea  day  - amlodipine 10mg once a day  Losartan medication was held during this hospital stay. Please follow-up with your primary care doctor after discharge from the hospital regarding when to restart this medication.   High blood pressure can cause damage to your heart and kidneys and increases your risk of heart attack and stroke. To avoid this, It is important that you continue to take this medication when you are discharged so that you can continue to control your blood pressure. Additionally be sure to follow up with your primary care physician on a regular basis to make sure your blood pressure continues to be well controlled. If you experience symptoms such as but not limited to: sudden onset blurry vision, nausea, vomiting, chest pain, shortness of breath, or palpitations, please go to the nearest emergency room.    Diagnosis: Urinary tract infection  Assessment and Plan of Treatment: You had been complaining of pain with urination and you were found to have a urinary tract infection.   Please start taking  - macrobid 100mg tablets two time a day for 5 days    Diagnosis: Type 2 diabetes mellitus  Assessment and Plan of Treatment: You have a known history of diabetes mellitus prior to your admission. This condition results from blood sugar levels getting too high because your body is more resistant to insulin. Uncontrolled blood sugar levels can lead to kidney and heart damage, pain/numbness/paralysis in your hands and feet, and increased rates of infections.  To manage this you are on a medication called insulin. Your insulin levels were increased due to increasing blood sugar levels.  Please continue taking  - insulin glargine 20units once before bedtime each day  - insulin lispro 5 units once before mealtime three times a day. Please do not take insulin lispro if you are not eating.  - continue to check your blood sugar levels each time before meals and bedtime. Do not administer insulin if your blood sugar levels are low  It is important that you continue to take this medication when you are discharged so that you can continue to control your blood sugar levels. Additionally be sure to follow up with your primary care physician, podiatrist, and ophthalmologist on a regular basis.

## 2020-04-11 NOTE — DISCHARGE NOTE NURSING/CASE MANAGEMENT/SOCIAL WORK - PATIENT PORTAL LINK FT
You can access the FollowMyHealth Patient Portal offered by [facility] by registering at the following website: [email or web address]. By joining [facility]’s FollowMyHealth portal, you will also be able to view your health information using other applications (apps) compatible with our system.

## 2020-04-11 NOTE — PROGRESS NOTE ADULT - ATTENDING COMMENTS
patient evaluated  clinical data reviewed in detail  received 2-3 doses of azithromycin/hydroxychloroquine only - due to inc QTc  low O2 requirements  ok for transfer to [facility]
patient seen and examined.   clinical data reviewed in detail.  lethargic  continue COVID protocol meds   follow CRP, DDimer   ECG follow up  on RA   encourage po intake
patient evaluated  clinical data reviewed  O2 NC required - desaturation noted off O2  fever  QTc increased  -currently off azithromycin/hydroxychloroquine  -repeat ECG - if QTc improves - restart azithromycin/hydroxychloroquine  resume diuretic  incentive spirometer  repeat procalcitonin
patient evaluated.  clinical data reviewed in detail.  O2 requirements at rest improving  COVID 19 biomarkers are trending favorably  follow up ECG/UA  consider for [facility] transfer

## 2020-04-11 NOTE — DISCHARGE NOTE PROVIDER - HOSPITAL COURSE
#Discharge: do not delete        Patient is 65-year-old female with a past medical history of HTN, HLD, DM, CAD, hypothyroidism, and asthma who presented from [place] with a month of fever, malaise, myalgias, and a 3-day history of dyspnea, diarrhea, nausea, and abdominal pain, found to be COVID-19+. Patient weaned to 1L NC and will be transferred to [facility] for further O2 weaning.        Problem List/Main Diagnoses (system-based):    #SARS-associated coronavirus infection  - COVID-19 PCR positive on 4/7. Pt started on azithromycin and plaquenil on 4/7, however only received 2 days worth of medications due to prolonged QTc. Last EKG QTc on 4/11 was 516. On discharge, CRP 3.26, ferritin 532, d-dimer <150.    #Acute hypoxic respiratory failure - 2/2 COVID-19. Pt needing nasal cannula and weaned to 1L NC on discharge from [facility] and transfer to [facility]    #HTN - continued on home hydralazine 100mg BID, amlodipine 10mg po qd, lasix 20mg po qd, isosorbide 120mg po qd, and losartan 100mg po qd held    #Asthma - continued on home albuterol and symbicort    #T2DM- HbA1c  7.6%. Pt on home lantus and novolog, however needing extra insulin coverage with fingersticks consistently >200s-300s. No episodes of hypoglycemia. Discharged on lantus 20u qhs and lispro 5units TID premeal        Inpatient treatment course: Azithromycin and plaquenil for 2 day dose (given prolonged QTc). Lantus 20u qhs, lispro 5u TID premeal    New medications: Lantus 20u qhs, lispro 5u TID premeal    Labs to be followed outpatient:     Exam to be followed outpatient: #Discharge: do not delete        Patient is 65-year-old female with a past medical history of HTN, HLD, DM, CAD, hypothyroidism, and asthma who presented from [place] with a month of fever, malaise, myalgias, and a 3-day history of dyspnea, diarrhea, nausea, and abdominal pain, found to be COVID-19+. Patient weaned to 1L NC and will be transferred to [facility] for further O2 weaning.        Problem List/Main Diagnoses (system-based):    #SARS-associated coronavirus infection  - COVID-19 PCR positive on 4/7. Pt started on azithromycin and plaquenil on 4/7, however only received 2 days worth of medications due to prolonged QTc. Last EKG QTc on 4/11 was 516. On discharge, CRP 3.26, ferritin 532, d-dimer <150.    #Acute hypoxic respiratory failure - 2/2 COVID-19. Pt needing nasal cannula and weaned to 1L NC on discharge from [facility] and transfer to [facility]    #HTN - continued on home hydralazine 100mg BID, amlodipine 10mg po qd, lasix 20mg po qd, isosorbide 120mg po qd, and losartan 100mg po qd held. sBP in 110s on discharge.    #Asthma - continued on home albuterol and symbicort    #T2DM- HbA1c  7.6%. Pt on home lantus and novolog, however needing extra insulin coverage with fingersticks consistently >200s-300s. No episodes of hypoglycemia. Discharged on lantus 20u qhs and lispro 5units TID premeal    #UTI - Dysuria with UCx growing 10,000 colonizing units of E. faeceum sensitive to nitrofurantoin. Discharged on 5 days of macrobid 100mg BID        Inpatient treatment course: Azithromycin and plaquenil for 2 day dose (given prolonged QTc). Lantus 20u qhs, lispro 5u TID premeal    New medications: Lantus 20u qhs, lispro 5u TID premeal, macrobid 100mg BID x 5 days    Labs to be followed outpatient:     Exam to be followed outpatient: #Discharge: do not delete        Patient is 65-year-old female with a past medical history of HTN, HLD, DM, CAD, hypothyroidism, and asthma who presented from [place] with a month of fever, malaise, myalgias, and a 3-day history of dyspnea, diarrhea, nausea, and abdominal pain, found to be COVID-19+. Patient weaned to 1L NC and will be transferred to [facility] for further O2 weaning.        Problem List/Main Diagnoses (system-based):    #SARS-associated coronavirus infection  - COVID-19 PCR positive on 4/7. Pt started on azithromycin and plaquenil on 4/7, however only received 2 days worth of medications due to prolonged QTc. Last EKG QTc on 4/11 was 516. On discharge, CRP 3.26, ferritin 532, d-dimer <150.    #Acute hypoxic respiratory failure - 2/2 COVID-19. Pt needing nasal cannula and weaned to 1L NC on discharge from [facility] and transfer to [facility]    #HTN - continued on home hydralazine 100mg BID, amlodipine 10mg po qd, lasix 20mg po qd, isosorbide 120mg po qd, and losartan 100mg po qd held. sBP in 110s on discharge.    #Asthma - continued on home albuterol and symbicort    #T2DM- HbA1c  7.6%. Pt on home lantus and novolog, however needing extra insulin coverage with fingersticks consistently >200s-300s. No episodes of hypoglycemia. Discharged on lantus 20u qhs and lispro 5units TID premeal    #UTI - Dysuria with UCx growing 10,000 colonizing units of E. faeceum sensitive to nitrofurantoin. Discharged on 5 days of macrobid 100mg BID        Inpatient treatment course: Azithromycin and plaquenil for 2 day dose (given prolonged QTc). Lantus 20u qhs, lispro 5u TID premeal    New medications: Lantus 20u qhs, lispro 5u TID premeal, macrobid 100mg BID x 5 days    Labs to be followed outpatient:     Exam to be followed outpatient:

## 2020-04-11 NOTE — PROGRESS NOTE ADULT - SUBJECTIVE AND OBJECTIVE BOX
OVERNIGHT EVENTS: No acute events overnight.    SUBJECTIVE/INTERVAL HPI: Patient was seen and examined at bedside.    VITALS  Vital Signs Last 24 Hrs  T(C): 36.6 (2020 09:13), Max: 38.3 (10 Apr 2020 09:29)  T(F): 97.9 (2020 09:13), Max: 101 (10 Apr 2020 09:29)  HR: 107 (:13) (97 - 110)  BP: 111/65 (:13) (109/63 - 151/66)  BP(mean): --  RR: 22 (2020 09:13) (22 - 25)  SpO2: 95% (:13) (83% - 97%)    I&O's Summary      CAPILLARY BLOOD GLUCOSE      POCT Blood Glucose.: 224 mg/dL (2020 08:21)  POCT Blood Glucose.: 133 mg/dL (10 Apr 2020 22:01)  POCT Blood Glucose.: 326 mg/dL (10 Apr 2020 17:21)  POCT Blood Glucose.: 307 mg/dL (10 Apr 2020 12:58)      PHYSICAL EXAM      MEDICATIONS  (STANDING):  amLODIPine   Tablet 10 milliGRAM(s) Oral daily  aspirin enteric coated 81 milliGRAM(s) Oral daily  atorvastatin 80 milliGRAM(s) Oral at bedtime  budesonide  80 MICROgram(s)/formoterol 4.5 MICROgram(s) Inhaler 2 Puff(s) Inhalation two times a day  dextrose 5%. 1000 milliLiter(s) (50 mL/Hr) IV Continuous <Continuous>  dextrose 50% Injectable 12.5 Gram(s) IV Push once  dextrose 50% Injectable 25 Gram(s) IV Push once  dextrose 50% Injectable 25 Gram(s) IV Push once  donepezil 5 milliGRAM(s) Oral at bedtime  enoxaparin Injectable 40 milliGRAM(s) SubCutaneous every 24 hours  famotidine    Tablet 20 milliGRAM(s) Oral two times a day  folic acid 1 milliGRAM(s) Oral daily  furosemide    Tablet 20 milliGRAM(s) Oral daily  gabapentin 600 milliGRAM(s) Oral three times a day  hydrALAZINE 100 milliGRAM(s) Oral two times a day  hydroxychloroquine 400 milliGRAM(s) Oral daily  insulin glargine Injectable (LANTUS) 13 Unit(s) SubCutaneous at bedtime  insulin lispro (HumaLOG) corrective regimen sliding scale   SubCutaneous Before meals and at bedtime  insulin lispro Injectable (HumaLOG) 3 Unit(s) SubCutaneous three times a day before meals  isosorbide   mononitrate ER Tablet (IMDUR) 120 milliGRAM(s) Oral daily  levothyroxine 75 MICROGram(s) Oral daily  loratadine 10 milliGRAM(s) Oral daily  sulfaSALAzine 500 milliGRAM(s) Oral two times a day    MEDICATIONS  (PRN):  acetaminophen   Tablet .. 650 milliGRAM(s) Oral every 6 hours PRN Temp greater or equal to 38C (100.4F), Mild Pain (1 - 3)  ALBUTerol    90 MICROgram(s) HFA Inhaler 2 Puff(s) Inhalation every 6 hours PRN Wheezing  dextrose 40% Gel 15 Gram(s) Oral once PRN Blood Glucose LESS THAN 70 milliGRAM(s)/deciliter  glucagon  Injectable 1 milliGRAM(s) IntraMuscular once PRN Glucose LESS THAN 70 milligrams/deciliter  guaiFENesin   Syrup  (Sugar-Free) 100 milliGRAM(s) Oral every 6 hours PRN Cough      LABS                        10.9   3.81  )-----------( 190      ( 10 Apr 2020 07:32 )             34.5     04-10    135  |  99  |  14  ----------------------------<  213<H>  4.1   |  23  |  1.03    Ca    8.6      10 Apr 2020 07:32  Phos  3.0     04-10  Mg     2.0     04-10    TPro  6.5  /  Alb  3.1<L>  /  TBili  0.4  /  DBili  x   /  AST  85<H>  /  ALT  63<H>  /  AlkPhos  48  04-10    LIVER FUNCTIONS - ( 10 Apr 2020 07:32 )  Alb: 3.1 g/dL / Pro: 6.5 g/dL / ALK PHOS: 48 U/L / ALT: 63 U/L / AST: 85 U/L / GGT: x             Urinalysis Basic - ( 2020 18:13 )    Color: Yellow / Appearance: Clear / S.025 / pH: x  Gluc: x / Ketone: NEGATIVE  / Bili: Negative / Urobili: 0.2 E.U./dL   Blood: x / Protein: NEGATIVE mg/dL / Nitrite: NEGATIVE   Leuk Esterase: NEGATIVE / RBC: x / WBC x   Sq Epi: x / Non Sq Epi: x / Bacteria: x            Radiology and other tests: Reviewed.

## 2020-04-11 NOTE — DISCHARGE NOTE PROVIDER - NSDCMRMEDTOKEN_GEN_ALL_CORE_FT
albuterol 90 mcg/inh inhalation aerosol: 2 puff(s) inhaled every 6 hours, As Needed -Wheezing - for shortness of breath and/or wheezing   amLODIPine 10 mg oral tablet: 1 tab(s) orally once a day  aspirin 81 mg oral delayed release tablet: 1 tab(s) orally once a day  atorvastatin 80 mg oral tablet: 1 tab(s) orally once a day (at bedtime)  azithromycin 250 mg oral tablet: 1 tab(s) orally once a day   budesonide-formoterol 80 mcg-4.5 mcg/inh inhalation aerosol: 2 puff(s) inhaled 2 times a day   donepezil 5 mg oral tablet: 1 tab(s) orally once a day (at bedtime)  famotidine 20 mg oral tablet: 1 tab(s) orally 2 times a day  folic acid 1 mg oral tablet: 1 tab(s) orally once a day  furosemide 20 mg oral tablet: 1 tab(s) orally once a day  gabapentin 600 mg oral tablet: 1 tab(s) orally 3 times a day  hydrALAZINE 100 mg oral tablet: 1 tab(s) orally 2 times a day  insulin glargine 100 units/mL subcutaneous solution: 20 unit(s) subcutaneous once a day (at bedtime)   insulin lispro 100 units/mL injectable solution: 5 unit(s) injectable 3 times a day (before meals)   isosorbide mononitrate 120 mg oral tablet, extended release: 1 tab(s) orally once a day (in the morning)  levothyroxine 75 mcg (0.075 mg) oral tablet: 1 tab(s) orally once a day  loratadine 10 mg oral tablet: 1 tab(s) orally once a day  sulfaSALAzine 500 mg oral tablet: 1 tab(s) orally 2 times a day albuterol 90 mcg/inh inhalation aerosol: 2 puff(s) inhaled every 6 hours, As Needed -Wheezing - for shortness of breath and/or wheezing   amLODIPine 10 mg oral tablet: 1 tab(s) orally once a day  aspirin 81 mg oral delayed release tablet: 1 tab(s) orally once a day  atorvastatin 80 mg oral tablet: 1 tab(s) orally once a day (at bedtime)  budesonide-formoterol 80 mcg-4.5 mcg/inh inhalation aerosol: 2 puff(s) inhaled 2 times a day   donepezil 5 mg oral tablet: 1 tab(s) orally once a day (at bedtime)  famotidine 20 mg oral tablet: 1 tab(s) orally 2 times a day  folic acid 1 mg oral tablet: 1 tab(s) orally once a day  furosemide 20 mg oral tablet: 1 tab(s) orally once a day  gabapentin 600 mg oral tablet: 1 tab(s) orally 3 times a day  hydrALAZINE 100 mg oral tablet: 1 tab(s) orally 2 times a day  insulin glargine 100 units/mL subcutaneous solution: 20 unit(s) subcutaneous once a day (at bedtime)   insulin lispro 100 units/mL injectable solution: 5 unit(s) injectable 3 times a day (before meals)   isosorbide mononitrate 120 mg oral tablet, extended release: 1 tab(s) orally once a day (in the morning)  levothyroxine 75 mcg (0.075 mg) oral tablet: 1 tab(s) orally once a day  loratadine 10 mg oral tablet: 1 tab(s) orally once a day  Macrobid 100 mg oral capsule: 1 cap(s) orally 2 times a day   sulfaSALAzine 500 mg oral tablet: 1 tab(s) orally 2 times a day albuterol 90 mcg/inh inhalation aerosol: 2 puff(s) inhaled every 6 hours, As Needed -Wheezing - for shortness of breath and/or wheezing   amLODIPine 10 mg oral tablet: 1 tab(s) orally once a day  aspirin 81 mg oral delayed release tablet: 1 tab(s) orally once a day  atorvastatin 80 mg oral tablet: 1 tab(s) orally once a day (at bedtime)  budesonide-formoterol 80 mcg-4.5 mcg/inh inhalation aerosol: 2 puff(s) inhaled 2 times a day   donepezil 5 mg oral tablet: 1 tab(s) orally once a day (at bedtime)  famotidine 20 mg oral tablet: 1 tab(s) orally 2 times a day  folic acid 1 mg oral tablet: 1 tab(s) orally once a day  furosemide 20 mg oral tablet: 1 tab(s) orally once a day  gabapentin 300 mg oral capsule: 2 cap(s) orally 3 times a day   hydrALAZINE 100 mg oral tablet: 1 tab(s) orally 2 times a day  insulin glargine 100 units/mL subcutaneous solution: 20 unit(s) subcutaneous once a day (at bedtime)   insulin lispro 100 units/mL injectable solution: 5 unit(s) injectable 3 times a day (before meals)   isosorbide mononitrate 120 mg oral tablet, extended release: 1 tab(s) orally once a day (in the morning)  levothyroxine 75 mcg (0.075 mg) oral tablet: 1 tab(s) orally once a day  loratadine 10 mg oral tablet: 1 tab(s) orally once a day  Macrobid 100 mg oral capsule: 1 cap(s) orally 2 times a day   sulfaSALAzine 500 mg oral tablet: 1 tab(s) orally 2 times a day albuterol 90 mcg/inh inhalation aerosol: 2 puff(s) inhaled every 6 hours, As Needed -Wheezing - for shortness of breath and/or wheezing   amLODIPine 10 mg oral tablet: 1 tab(s) orally once a day  aspirin 81 mg oral delayed release tablet: 1 tab(s) orally once a day  atorvastatin 80 mg oral tablet: 1 tab(s) orally once a day (at bedtime)  budesonide-formoterol 80 mcg-4.5 mcg/inh inhalation aerosol: 2 puff(s) inhaled 2 times a day   donepezil 5 mg oral tablet: 1 tab(s) orally once a day (at bedtime)  famotidine 20 mg oral tablet: 1 tab(s) orally 2 times a day  folic acid 1 mg oral tablet: 1 tab(s) orally once a day  furosemide 20 mg oral tablet: 1 tab(s) orally once a day  gabapentin 300 mg oral capsule: 2 cap(s) orally 3 times a day   hydrALAZINE 25 mg oral tablet: 4 tab(s) orally 2 times a day   insulin glargine 100 units/mL subcutaneous solution: 20 unit(s) subcutaneous once a day (at bedtime)   insulin lispro 100 units/mL injectable solution: 5 unit(s) injectable 3 times a day (before meals)   isosorbide mononitrate 60 mg oral tablet, extended release: 2 tab(s) orally once a day (in the morning)   levothyroxine 75 mcg (0.075 mg) oral tablet: 1 tab(s) orally once a day  loratadine 10 mg oral tablet: 1 tab(s) orally once a day  Macrobid 100 mg oral capsule: 1 cap(s) orally 2 times a day   sulfaSALAzine 500 mg oral tablet: 1 tab(s) orally 2 times a day

## 2020-04-15 DIAGNOSIS — E03.9 HYPOTHYROIDISM, UNSPECIFIED: ICD-10-CM

## 2020-04-15 DIAGNOSIS — I10 ESSENTIAL (PRIMARY) HYPERTENSION: ICD-10-CM

## 2020-04-15 DIAGNOSIS — J96.01 ACUTE RESPIRATORY FAILURE WITH HYPOXIA: ICD-10-CM

## 2020-04-15 DIAGNOSIS — E11.9 TYPE 2 DIABETES MELLITUS WITHOUT COMPLICATIONS: ICD-10-CM

## 2020-04-15 DIAGNOSIS — E78.5 HYPERLIPIDEMIA, UNSPECIFIED: ICD-10-CM

## 2020-04-15 DIAGNOSIS — R50.9 FEVER, UNSPECIFIED: ICD-10-CM

## 2020-04-15 DIAGNOSIS — I25.10 ATHEROSCLEROTIC HEART DISEASE OF NATIVE CORONARY ARTERY WITHOUT ANGINA PECTORIS: ICD-10-CM

## 2020-04-15 DIAGNOSIS — J45.909 UNSPECIFIED ASTHMA, UNCOMPLICATED: ICD-10-CM

## 2020-04-15 DIAGNOSIS — Z79.82 LONG TERM (CURRENT) USE OF ASPIRIN: ICD-10-CM

## 2020-04-15 DIAGNOSIS — J12.89 OTHER VIRAL PNEUMONIA: ICD-10-CM

## 2020-04-15 DIAGNOSIS — Z79.4 LONG TERM (CURRENT) USE OF INSULIN: ICD-10-CM

## 2020-04-15 DIAGNOSIS — N39.0 URINARY TRACT INFECTION, SITE NOT SPECIFIED: ICD-10-CM

## 2020-04-15 DIAGNOSIS — D64.9 ANEMIA, UNSPECIFIED: ICD-10-CM

## 2020-04-15 DIAGNOSIS — U07.1 COVID-19: ICD-10-CM

## 2020-04-15 DIAGNOSIS — R74.0 NONSPECIFIC ELEVATION OF LEVELS OF TRANSAMINASE AND LACTIC ACID DEHYDROGENASE [LDH]: ICD-10-CM

## 2022-12-13 NOTE — H&P ADULT - HEART RATE (BEATS/MIN)
Chief Complaints and History of Present Illnesses   Patient presents with     Panuveitis Follow Up     Chief Complaint(s) and History of Present Illness(es)     Panuveitis Follow Up            Laterality: right eye    Quality: VA is about the same    Associated symptoms: eye pain (mild), photophobia and floaters.  Negative for flashes    Treatments tried: eye drops          Comments    Here for panuveitis right eye.     Acetaloamide 250g BID daily  Cosopt TID both eyes  Prednisolone QID right eye, BID left eye  Tacrolmius at bedtime left eye  Roland butch at bedtime right eye.    Rayo Hussein COT 1:11 PM December 13, 2022                    
103

## 2023-02-22 NOTE — ED ADULT NURSE NOTE - DOES PATIENT HAVE ADVANCE DIRECTIVE
No
Detail Level: Simple
General Sunscreen Counseling: Broad spectrum sunscreen should be applied especially during peak UV exposure; at least an SPF 30 but SPF 50 would be better.  Reapply every 1-2 hours, more importantly after exercise and swimming.

## 2023-08-17 NOTE — ED ADULT NURSE NOTE - NSSUHOSCREENINGYN_ED_ALL_ED
Yes - the patient is able to be screened Infliximab Counseling:  I discussed with the patient the risks of infliximab including but not limited to myelosuppression, immunosuppression, autoimmune hepatitis, demyelinating diseases, lymphoma, and serious infections.  The patient understands that monitoring is required including a PPD at baseline and must alert us or the primary physician if symptoms of infection or other concerning signs are noted.

## 2023-09-14 NOTE — H&P ADULT - PROBLEM SELECTOR PROBLEM 1
UTI (urinary tract infection) Sepsis Manual Repair Warning Statement: We plan on removing the manually selected variable below in favor of our much easier automatic structured text blocks found in the previous tab. We decided to do this to help make the flow better and give you the full power of structured data. Manual selection is never going to be ideal in our platform and I would encourage you to avoid using manual selection from this point on, especially since I will be sunsetting this feature. It is important that you do one of two things with the customized text below. First, you can save all of the text in a word file so you can have it for future reference. Second, transfer the text to the appropriate area in the Library tab. Lastly, if there is a flap or graft type which we do not have you need to let us know right away so I can add it in before the variable is hidden. No need to panic, we plan to give you roughly 6 months to make the change.

## 2023-12-02 NOTE — ED ADULT NURSE NOTE - ISOLATION INDICATION AIRBORNE CONTACT
Antibiotic eye drops as prescribed   Avoid rubbing the eye or pulling on the eyelashes / crusty discharge   Hand hygiene and eye hygiene discussed   Not contagious after 24 hours of antibiotics   If a contact lens user - do not use contact lenses until the infection has completely cleared up and you have completed the antibiotic course. Use a new pair when restarting use.   Dispose all eye make or eye creams that have been used around the time of symptoms.   If any worsening return immediately / GO to the ER     Nasal saline rinses, nose Renee for suctioning  Make sure to keep fever down, comes in 101.2 °F, Tylenol/Motrin as needed for fever, avoid high temperatures to reduce the risk of febrile seizures  Tylenol: 225 mg every 6-8 hours as needed for fever/pain  And/or Motrin 150 mg every 6 8 hours as needed for fever/pain.   Other Specify

## 2025-01-10 NOTE — ED ADULT NURSE NOTE - AS SC BRADEN FRICTION
Spoke with Luisa, relayed Dr. Arizmendi message regarding results.  Patient verbalized understanding and no furhter questions at this time.   (3) no apparent problem

## 2025-04-01 NOTE — H&P ADULT - PROBLEM SELECTOR PLAN 6
She will investigate.   Orders:    Hemoglobin A1C; Future    Lipid Panel Non-Fasting; Future     Continue home Atorvastatin 80mg PO QD